# Patient Record
Sex: MALE | Race: WHITE | NOT HISPANIC OR LATINO | Employment: OTHER | URBAN - NONMETROPOLITAN AREA
[De-identification: names, ages, dates, MRNs, and addresses within clinical notes are randomized per-mention and may not be internally consistent; named-entity substitution may affect disease eponyms.]

---

## 2019-07-09 ENCOUNTER — HOSPITAL ENCOUNTER (OUTPATIENT)
Dept: GENERAL RADIOLOGY | Facility: HOSPITAL | Age: 79
Discharge: HOME | End: 2019-07-09
Attending: INTERNAL MEDICINE

## 2019-07-09 LAB
ALBUMIN SERPL-MCNC: 4.4 G/DL (ref 3.5–5)
ALBUMIN/GLOB SERPL: 1.2 {RATIO} (ref 1.4–2.6)
ALP SERPL-CCNC: 96 U/L (ref 56–155)
ALT SERPL-CCNC: 38 U/L (ref 10–40)
ANION GAP SERPL CALC-SCNC: 21 MMOL/L (ref 8–19)
AST SERPL-CCNC: 32 U/L (ref 15–50)
BASOPHILS # BLD AUTO: 0.12 10*3/UL (ref 0–0.2)
BASOPHILS NFR BLD AUTO: 0.9 % (ref 0–3)
BILIRUB SERPL-MCNC: 0.5 MG/DL (ref 0.2–1.3)
BUN SERPL-MCNC: 11 MG/DL (ref 5–25)
BUN/CREAT SERPL: 14 {RATIO} (ref 6–20)
CALCIUM SERPL-MCNC: 9.7 MG/DL (ref 8.7–10.4)
CHLORIDE SERPL-SCNC: 101 MMOL/L (ref 99–111)
CHOLEST SERPL-MCNC: 134 MG/DL (ref 107–200)
CHOLEST/HDLC SERPL: 3.1 {RATIO} (ref 3–6)
CONV ABS IMM GRAN: 0.05 10*3/UL (ref 0–0.2)
CONV CO2: 19 MMOL/L (ref 22–32)
CONV IMMATURE GRAN: 0.4 % (ref 0–1.8)
CONV TOTAL PROTEIN: 8.1 G/DL (ref 6.3–8.2)
CREAT UR-MCNC: 0.78 MG/DL (ref 0.7–1.2)
DEPRECATED RDW RBC AUTO: 48 FL (ref 35.1–43.9)
EOSINOPHIL # BLD AUTO: 0.28 10*3/UL (ref 0–0.7)
EOSINOPHIL # BLD AUTO: 2.2 % (ref 0–7)
ERYTHROCYTE [DISTWIDTH] IN BLOOD BY AUTOMATED COUNT: 13.7 % (ref 11.6–14.4)
GFR SERPLBLD BASED ON 1.73 SQ M-ARVRAT: >60 ML/MIN/{1.73_M2}
GLOBULIN UR ELPH-MCNC: 3.7 G/DL (ref 2–3.5)
GLUCOSE SERPL-MCNC: 91 MG/DL (ref 70–99)
HBA1C MFR BLD: 16.4 G/DL (ref 14–18)
HCT VFR BLD AUTO: 48 % (ref 42–52)
HDLC SERPL-MCNC: 43 MG/DL (ref 40–60)
LDLC SERPL CALC-MCNC: 55 MG/DL (ref 70–100)
LYMPHOCYTES # BLD AUTO: 3.33 10*3/UL (ref 1–5)
MCH RBC QN AUTO: 32.9 PG (ref 27–31)
MCHC RBC AUTO-ENTMCNC: 34.2 G/DL (ref 33–37)
MCV RBC AUTO: 96.2 FL (ref 80–96)
MONOCYTES # BLD AUTO: 0.96 10*3/UL (ref 0.2–1.2)
MONOCYTES NFR BLD AUTO: 7.5 % (ref 3–10)
NEUTROPHILS # BLD AUTO: 8.03 10*3/UL (ref 2–8)
NEUTROPHILS NFR BLD AUTO: 62.9 % (ref 30–85)
NRBC CBCN: 0 % (ref 0–0.7)
OSMOLALITY SERPL CALC.SUM OF ELEC: 283 MOSM/KG (ref 273–304)
PLATELET # BLD AUTO: 242 10*3/UL (ref 130–400)
PMV BLD AUTO: 9.8 FL (ref 9.4–12.4)
POTASSIUM SERPL-SCNC: 3.7 MMOL/L (ref 3.5–5.3)
RBC # BLD AUTO: 4.99 10*6/UL (ref 4.7–6.1)
SODIUM SERPL-SCNC: 137 MMOL/L (ref 135–147)
TRIGL SERPL-MCNC: 459 MG/DL (ref 40–150)
TSH SERPL-ACNC: 1.72 M[IU]/L (ref 0.27–4.2)
VARIANT LYMPHS NFR BLD MANUAL: 26.1 % (ref 20–45)
WBC # BLD AUTO: 12.77 10*3/UL (ref 4.8–10.8)

## 2019-10-09 ENCOUNTER — HOSPITAL ENCOUNTER (OUTPATIENT)
Dept: GENERAL RADIOLOGY | Facility: HOSPITAL | Age: 79
Discharge: HOME | End: 2019-10-09
Attending: INTERNAL MEDICINE

## 2019-10-09 LAB
ALBUMIN SERPL-MCNC: 4.3 G/DL (ref 3.5–5)
ALBUMIN/GLOB SERPL: 1.1 {RATIO} (ref 1.4–2.6)
ALP SERPL-CCNC: 106 U/L (ref 56–155)
ALT SERPL-CCNC: 33 U/L (ref 10–40)
ANION GAP SERPL CALC-SCNC: 19 MMOL/L (ref 8–19)
AST SERPL-CCNC: 40 U/L (ref 15–50)
BASOPHILS # BLD AUTO: 0.14 10*3/UL (ref 0–0.2)
BASOPHILS NFR BLD AUTO: 1.1 % (ref 0–3)
BILIRUB SERPL-MCNC: 0.35 MG/DL (ref 0.2–1.3)
BUN SERPL-MCNC: 10 MG/DL (ref 5–25)
BUN/CREAT SERPL: 11 {RATIO} (ref 6–20)
CALCIUM SERPL-MCNC: 10.1 MG/DL (ref 8.7–10.4)
CHLORIDE SERPL-SCNC: 99 MMOL/L (ref 99–111)
CHOLEST SERPL-MCNC: 153 MG/DL (ref 107–200)
CHOLEST/HDLC SERPL: 3.6 {RATIO} (ref 3–6)
CONV ABS IMM GRAN: 0.05 10*3/UL (ref 0–0.2)
CONV CO2: 21 MMOL/L (ref 22–32)
CONV IMMATURE GRAN: 0.4 % (ref 0–1.8)
CONV TOTAL PROTEIN: 8.1 G/DL (ref 6.3–8.2)
CREAT UR-MCNC: 0.9 MG/DL (ref 0.7–1.2)
DEPRECATED RDW RBC AUTO: 49 FL (ref 35.1–43.9)
EOSINOPHIL # BLD AUTO: 0.34 10*3/UL (ref 0–0.7)
EOSINOPHIL # BLD AUTO: 2.7 % (ref 0–7)
ERYTHROCYTE [DISTWIDTH] IN BLOOD BY AUTOMATED COUNT: 13.3 % (ref 11.6–14.4)
GFR SERPLBLD BASED ON 1.73 SQ M-ARVRAT: >60 ML/MIN/{1.73_M2}
GLOBULIN UR ELPH-MCNC: 3.8 G/DL (ref 2–3.5)
GLUCOSE SERPL-MCNC: 120 MG/DL (ref 70–99)
HCT VFR BLD AUTO: 48.9 % (ref 42–52)
HDLC SERPL-MCNC: 42 MG/DL (ref 40–60)
HGB BLD-MCNC: 16.5 G/DL (ref 14–18)
LDLC SERPL CALC-MCNC: 42 MG/DL (ref 70–100)
LYMPHOCYTES # BLD AUTO: 3.29 10*3/UL (ref 1–5)
LYMPHOCYTES NFR BLD AUTO: 26.4 % (ref 20–45)
MCH RBC QN AUTO: 33.3 PG (ref 27–31)
MCHC RBC AUTO-ENTMCNC: 33.7 G/DL (ref 33–37)
MCV RBC AUTO: 98.6 FL (ref 80–96)
MONOCYTES # BLD AUTO: 1.01 10*3/UL (ref 0.2–1.2)
MONOCYTES NFR BLD AUTO: 8.1 % (ref 3–10)
NEUTROPHILS # BLD AUTO: 7.61 10*3/UL (ref 2–8)
NEUTROPHILS NFR BLD AUTO: 61.3 % (ref 30–85)
NRBC CBCN: 0 % (ref 0–0.7)
OSMOLALITY SERPL CALC.SUM OF ELEC: 280 MOSM/KG (ref 273–304)
PLATELET # BLD AUTO: 268 10*3/UL (ref 130–400)
PMV BLD AUTO: 10.1 FL (ref 9.4–12.4)
POTASSIUM SERPL-SCNC: 3.9 MMOL/L (ref 3.5–5.3)
RBC # BLD AUTO: 4.96 10*6/UL (ref 4.7–6.1)
SODIUM SERPL-SCNC: 135 MMOL/L (ref 135–147)
TRIGL SERPL-MCNC: 750 MG/DL (ref 40–150)
TSH SERPL-ACNC: 1.95 M[IU]/L (ref 0.27–4.2)
WBC # BLD AUTO: 12.44 10*3/UL (ref 4.8–10.8)

## 2020-12-15 ENCOUNTER — HOSPITAL ENCOUNTER (OUTPATIENT)
Dept: GENERAL RADIOLOGY | Facility: HOSPITAL | Age: 80
Discharge: HOME | End: 2020-12-15
Attending: INTERNAL MEDICINE

## 2020-12-15 LAB
BASOPHILS # BLD AUTO: 0.12 10*3/UL (ref 0–0.2)
BASOPHILS NFR BLD AUTO: 1.2 % (ref 0–3)
CONV ABS IMM GRAN: 0.04 10*3/UL (ref 0–0.2)
CONV IMMATURE GRAN: 0.4 % (ref 0–1.8)
DEPRECATED RDW RBC AUTO: 46.7 FL (ref 35.1–43.9)
EOSINOPHIL # BLD AUTO: 0.32 10*3/UL (ref 0–0.7)
EOSINOPHIL # BLD AUTO: 3.3 % (ref 0–7)
ERYTHROCYTE [DISTWIDTH] IN BLOOD BY AUTOMATED COUNT: 13.2 % (ref 11.6–14.4)
HCT VFR BLD AUTO: 50.1 % (ref 42–52)
HGB BLD-MCNC: 17.3 G/DL (ref 14–18)
LYMPHOCYTES # BLD AUTO: 4.14 10*3/UL (ref 1–5)
LYMPHOCYTES NFR BLD AUTO: 43 % (ref 20–45)
MCH RBC QN AUTO: 33.3 PG (ref 27–31)
MCHC RBC AUTO-ENTMCNC: 34.5 G/DL (ref 33–37)
MCV RBC AUTO: 96.5 FL (ref 80–96)
MONOCYTES # BLD AUTO: 0.81 10*3/UL (ref 0.2–1.2)
MONOCYTES NFR BLD AUTO: 8.4 % (ref 3–10)
NEUTROPHILS # BLD AUTO: 4.19 10*3/UL (ref 2–8)
NEUTROPHILS NFR BLD AUTO: 43.7 % (ref 30–85)
NRBC CBCN: 0 % (ref 0–0.7)
PLATELET # BLD AUTO: 217 10*3/UL (ref 130–400)
PMV BLD AUTO: 10.2 FL (ref 9.4–12.4)
RBC # BLD AUTO: 5.19 10*6/UL (ref 4.7–6.1)
WBC # BLD AUTO: 9.62 10*3/UL (ref 4.8–10.8)

## 2021-02-09 ENCOUNTER — CONVERSION ENCOUNTER (OUTPATIENT)
Dept: ORTHOPEDIC SURGERY | Facility: CLINIC | Age: 81
End: 2021-02-09

## 2021-02-09 ENCOUNTER — OFFICE VISIT CONVERTED (OUTPATIENT)
Dept: ORTHOPEDIC SURGERY | Facility: CLINIC | Age: 81
End: 2021-02-09
Attending: PHYSICIAN ASSISTANT

## 2021-02-23 ENCOUNTER — OFFICE VISIT CONVERTED (OUTPATIENT)
Dept: ORTHOPEDIC SURGERY | Facility: CLINIC | Age: 81
End: 2021-02-23
Attending: PHYSICIAN ASSISTANT

## 2021-04-06 ENCOUNTER — CONVERSION ENCOUNTER (OUTPATIENT)
Dept: ORTHOPEDIC SURGERY | Facility: CLINIC | Age: 81
End: 2021-04-06

## 2021-04-06 ENCOUNTER — OFFICE VISIT CONVERTED (OUTPATIENT)
Dept: ORTHOPEDIC SURGERY | Facility: CLINIC | Age: 81
End: 2021-04-06

## 2021-05-10 NOTE — H&P
History and Physical      Patient Name: Rian Mckay   Patient ID: [id number]   Sex: Male   YOB: 1940    Referring Provider: Jose Varela MD    Visit Date: February 9, 2021    Provider: Juana Gutierrez PA-C   Location: [facility]   Location Address: [address] [place] [address]  [id number]   Location Phone: [phone]          Chief Complaint  · Left hip surgery      History Of Present Illness  Rian Mckay is a 80 year old male who presents today to [facility].      Patient is status post left hip hemiarthroplasty performed 01/25/21 by Dr. Varela. Patient states mild pain along the anterior thigh. Patient states Home Health is providing physical therapy. Patient is using a walker.       Past Medical History  Heart Attack; Hypertension; Shortness of Breath         Past Surgical History  Artificial Joints/Limbs; heart surgery         Allergy List  NO KNOWN DRUG ALLERGIES         Family Medical History  Heart Disease; Osteoporosis         Social History  Alcohol Use (Current some day); lives alone; Recreational Drug Use (Never); Retired.; Tobacco (Current every day);          Review of Systems  · Constitutional  o Denies  o : fever, chills, weight loss  · Cardiovascular  o Denies  o : chest pain, shortness of breath  · Gastrointestinal  o Denies  o : liver disease, heartburn, nausea, blood in stools  · Genitourinary  o Denies  o : painful urination, blood in urine  · Integument  o Denies  o : rash, itching  · Neurologic  o Denies  o : headache, weakness, loss of consciousness  · Musculoskeletal  o Denies  o : painful, swollen joints  · Psychiatric  o Denies  o : drug/alcohol addiction, anxiety, depression      Vitals  Date Time BP Position Site L\R Cuff Size HR RR TEMP (F) WT  HT  BMI kg/m2 BSA m2 O2 Sat FR L/min FiO2 HC       02/09/2021 10:04 AM      77 - R   205lbs 16oz 6'   27.94 2.18 97 %            Physical Examination  · Constitutional  o Appearance  o :  well developed, well-nourished, no obvious deformities present  · Head and Face  o Head  o :   § Inspection  § : normocephalic  o Face  o :   § Inspection  § : no facial lesions  · Eyes  o Conjunctivae  o : conjunctivae normal  o Sclerae  o : sclerae white  · Ears, Nose, Mouth and Throat  o Ears  o :   § External Ears  § : appearance within normal limits  § Hearing  § : intact  o Nose  o :   § External Nose  § : appearance normal  · Neck  o Inspection/Palpation  o : normal appearance  o Range of Motion  o : full range of motion  · Respiratory  o Respiratory Effort  o : breathing unlabored  o Inspection of Chest  o : normal appearance  o Auscultation of Lungs  o : no audible wheezing or rales  · Cardiovascular  o Heart  o : regular rate  · Gastrointestinal  o Abdominal Examination  o : soft and non-tender  · Skin and Subcutaneous Tissue  o General Inspection  o : intact, no rashes  · Psychiatric  o General  o : Alert and oriented x3  o Judgement and Insight  o : judgment and insight intact  o Mood and Affect  o : mood normal, affect appropriate  · In Office Procedures  o View  o : AP/LATERAL  o Site  o : left, hip   o Indication  o : Left hip pain   o Study  o : X-rays ordered, taken in the office, and reviewed today.  o Xray  o : stable implant  o Comparative Data  o : Comparative Data found and reviewed today   · Left Hip-Street Exam  o Inspection  o : incision well healed, mild erythema   o Palpation  o : tenderness at hip and pelvic muscles  o ROM  o : normal extension (30), normal abduction (45-50), normal adduction, normal internal rotation, normal external rotation  o Neurologic Testing  o : Neurovascular and sensation intact          Assessment  · Aftercare;following joint replacement     V54.81/Z47.1  · Left Pain: Hip     719.45/M25.559      Plan  · Orders  o Hip (Left) 2 or more views (includes AP Pelvis) [facility] Preferred View ([id number]) - 719.45/M25.559 - 02/09/2021  · Medications  o Medications have been  Reconciled  o Transition of Care or Provider Policy  · Instructions  o Staples removed in clinic today.  o Reviewed the patient's Past Medical, Social, and Family history as well as the ROS at today's visit, no changes.  o Call or return if worsening symptoms.  o X-ray ordered, taken and reviewed at this visit.  o Follow Up in 2 weeks.  o Electronically Identified Patient Education Materials Provided Electronically     Prescribed Keflex 500mg one po TID x 10 days, Norco 7.5/325mg one po q 6 hours   Follow up 2 weeks, check scar             Electronically Signed by: AZEEM Lopez-C -Author on February 9, 2021 10:40:00 AM  Electronically Co-signed by: Jose Varela MD -Reviewer on February 9, 2021 06:00:14 PM

## 2021-05-14 VITALS — HEIGHT: 72 IN | OXYGEN SATURATION: 95 % | BODY MASS INDEX: 27.94 KG/M2 | HEART RATE: 100 BPM | WEIGHT: 206.31 LBS

## 2021-05-14 VITALS — WEIGHT: 206 LBS | OXYGEN SATURATION: 97 % | BODY MASS INDEX: 27.9 KG/M2 | HEIGHT: 72 IN | HEART RATE: 77 BPM

## 2021-05-14 VITALS — OXYGEN SATURATION: 96 % | WEIGHT: 207.37 LBS | BODY MASS INDEX: 28.09 KG/M2 | HEART RATE: 84 BPM | HEIGHT: 72 IN

## 2021-05-14 NOTE — PROGRESS NOTES
Progress Note      Patient Name: Rian Mckay   Patient ID: [id number]   Sex: Male   YOB: 1940        Visit Date: April 6, 2021    Provider: Sukhwinder Dill PA-C   Location[facility]   Location Address: [address] [place] [address]  [id number]   Location Phone: [phone]          Chief Complaint  · left hip pain      History Of Present Illness  Rian Mckay is a 80 year old male who presents today to [facility].      Patient follows up today for left hip hemiarthroplasty performed 1/25/2021 by  to treat a left displaced femoral neck fracture.  Patient reports that he is doing better since the time of surgery however complains of decreased activity tolerance.  Patient was discharged from physical therapy on 3/23/2021 after completing 18 sessions.       Past Medical History  Heart Attack; Hypertension; Shortness of Breath         Past Surgical History  Artificial Joints/Limbs; heart surgery         Medication List  Norco 7.5-325 mg oral tablet         Allergy List  NO KNOWN DRUG ALLERGIES         Family Medical History  Heart Disease; - No Family History of Colorectal Cancer; Osteoporosis         Social History  Alcohol Use (Current some day); lives alone; Recreational Drug Use (Never); Retired.; Tobacco (Current every day);          Review of Systems  · Constitutional  o Denies  o : fever, chills, weight loss  · Cardiovascular  o Denies  o : chest pain, shortness of breath  · Gastrointestinal  o Denies  o : liver disease, heartburn, nausea, blood in stools  · Genitourinary  o Denies  o : painful urination, blood in urine  · Integument  o Denies  o : rash, itching  · Neurologic  o Denies  o : headache, weakness, loss of consciousness  · Musculoskeletal  o Denies  o : painful, swollen joints  · Psychiatric  o Denies  o : drug/alcohol addiction, anxiety, depression      Vitals  Date Time BP Position Site L\R Cuff Size HR RR TEMP (F) WT  HT  BMI kg/m2  BSA m2 O2 Sat FR L/min FiO2        04/06/2021 02:17 PM      100 - R   206lbs 5oz 6'   27.98 2.18 95 %            Physical Examination  · Constitutional  o Appearance  o : well developed, well-nourished, no obvious deformities present  · Head and Face  o Head  o :   § Inspection  § : normocephalic  o Face  o :   § Inspection  § : no facial lesions  · Eyes  o Conjunctivae  o : conjunctivae normal  o Sclerae  o : sclerae white  · Ears, Nose, Mouth and Throat  o Ears  o :   § External Ears  § : appearance within normal limits  § Hearing  § : intact  o Nose  o :   § External Nose  § : appearance normal  · Neck  o Inspection/Palpation  o : normal appearance  o Range of Motion  o : full range of motion  · Respiratory  o Respiratory Effort  o : breathing unlabored  o Inspection of Chest  o : normal appearance  o Auscultation of Lungs  o : no audible wheezing or rales  · Cardiovascular  o Heart  o : regular rate  · Gastrointestinal  o Abdominal Examination  o : soft and non-tender  · Skin and Subcutaneous Tissue  o General Inspection  o : intact, no rashes  · Psychiatric  o General  o : Alert and oriented x3  o Judgement and Insight  o : judgment and insight intact  o Mood and Affect  o : mood normal, affect appropriate  · Left Hip  o Inspection  o : Appearance is normal anatomic and atraumatic. Range of motion is consistent with the contralateral side. Patient is appreciated to have a two-stage sit to stand and uses a cane to assist with ambulation.  · In Office Procedures  o View  o : AP/LATERAL  o Site  o : left, hip   o Indication  o : left hip pain  o Study  o : X-rays ordered, taken in the office, and reviewed today.  o Xray  o : Postsurgical changes of left hemiarthroplasty with no appreciable loosening or displacement of retained hardware. Although alignments are appreciated to be anatomic.          Assessment  · Left Pain: Hip     719.45/M25.559  · Surgical aftercare, musculoskeletal  system     V58.78/Z47.89  · Closed fracture of neck of left femur with routine healing, subsequent encounter     V54.13/S72.002D      Plan  · Orders  o Hip (Left) 2 or more views (includes AP Pelvis) [facility] Preferred View ([id number]) - 719.45/M25.559 - 04/06/2021  · Medications  o Medications have been Reconciled  o Transition of Care or Provider Policy  · Instructions  o Reviewed the patient's Past Medical, Social, and Family history as well as the ROS at today's visit, no changes.  o Call or return if worsening symptoms.  o Patient may progressively return to all of his regular activities without restriction as discussed in clinic today. Patient is informed that activity tolerance should improve over the next few months. Patient is to follow-up in 3 months to evaluate his course of healing.  o Portions of this note were generated with voice recognition software. While efforts have been made to proofread the text, some sound alike errors may still persist.             Electronically Signed by: Sukhwinder Dill PA-C -Author on April 11, 2021 03:49:17 PM

## 2021-05-14 NOTE — PROGRESS NOTES
Progress Note      Patient Name: Rian Mckay   Patient ID: [id number]   Sex: Male   YOB: 1940        Visit Date: February 23, 2021    Provider: Juana Gutierrez PA-C   Location: [facility]   Location Address: [address] [place] [address]  [id number]   Location Phone: [phone]          Chief Complaint  · LEFT HIP PAIN      History Of Present Illness  Rian Mckay is a 80 year old male who presents today to [facility].      Patient is status post left hip hemiarthroplasty performed 01/25/21 by Dr. Varela. Patient states mild pain along the anterior thigh. Patient states Home Health is providing physical therapy. Patient is using a cane.            Past Medical History  Heart Attack; Hypertension; Shortness of Breath         Past Surgical History  Artificial Joints/Limbs; heart surgery         Medication List  Norco 7.5-325 mg oral tablet         Allergy List  NO KNOWN DRUG ALLERGIES         Family Medical History  Heart Disease; - No Family History of Colorectal Cancer; Osteoporosis         Social History  Alcohol Use (Current some day); lives alone; Recreational Drug Use (Never); Retired.; Tobacco (Current every day);          Review of Systems  · Constitutional  o Denies  o : fever, chills, weight loss  · Cardiovascular  o Denies  o : chest pain, shortness of breath  · Gastrointestinal  o Denies  o : liver disease, heartburn, nausea, blood in stools  · Genitourinary  o Denies  o : painful urination, blood in urine  · Integument  o Denies  o : rash, itching  · Neurologic  o Denies  o : headache, weakness, loss of consciousness  · Musculoskeletal  o Denies  o : painful, swollen joints  · Psychiatric  o Denies  o : drug/alcohol addiction, anxiety, depression      Vitals  Date Time BP Position Site L\R Cuff Size HR RR TEMP (F) WT  HT  BMI kg/m2 BSA m2 O2 Sat FR L/min FiO2 HC       02/23/2021 02:31 PM      84 - R   207lbs 6oz 6'   28.12 2.19 96 %  21%           Physical Examination  · Constitutional  o Appearance  o : well developed, well-nourished, no obvious deformities present  · Head and Face  o Head  o :   § Inspection  § : normocephalic  o Face  o :   § Inspection  § : no facial lesions  · Eyes  o Conjunctivae  o : conjunctivae normal  o Sclerae  o : sclerae white  · Ears, Nose, Mouth and Throat  o Ears  o :   § External Ears  § : appearance within normal limits  § Hearing  § : intact  o Nose  o :   § External Nose  § : appearance normal  · Neck  o Inspection/Palpation  o : normal appearance  o Range of Motion  o : full range of motion  · Respiratory  o Respiratory Effort  o : breathing unlabored  o Inspection of Chest  o : normal appearance  o Auscultation of Lungs  o : no audible wheezing or rales  · Cardiovascular  o Heart  o : regular rate  · Gastrointestinal  o Abdominal Examination  o : soft and non-tender  · Skin and Subcutaneous Tissue  o General Inspection  o : intact, no rashes  · Psychiatric  o General  o : Alert and oriented x3  o Judgement and Insight  o : judgment and insight intact  o Mood and Affect  o : mood normal, affect appropriate  · Left Hip-Street Exam  o Inspection  o : well-healed scar   o Palpation  o : tenderness at hip and pelvic muscles  o ROM  o : normal extension (30), normal abduction (45-50), normal adduction, normal internal rotation, normal external rotation  o Special Tests  o : no pain with flexion, no pain with extension, no pain with rotation  o Neurologic Testing  o : Neurovascular and sensation intact          Assessment  · Aftercare;following joint replacement     V54.81/Z47.1  · Left Pain: Hip     719.45/M25.559      Plan  · Medications  o Medications have been Reconciled  o Transition of Care or Provider Policy  · Instructions  o Reviewed the patient's Past Medical, Social, and Family history as well as the ROS at today's visit, no changes.  o Call or return if worsening symptoms.  o Follow up in 6  weeks.  o Electronically Identified Patient Education Materials Provided Electronically     follow up 6 weeks, x ray             Electronically Signed by: EULALIO LopezC -Author on February 23, 2021 03:19:31 PM

## 2021-07-06 ENCOUNTER — OFFICE VISIT (OUTPATIENT)
Dept: ORTHOPEDIC SURGERY | Facility: CLINIC | Age: 81
End: 2021-07-06

## 2021-07-06 VITALS — OXYGEN SATURATION: 95 % | WEIGHT: 210 LBS | BODY MASS INDEX: 28.44 KG/M2 | HEIGHT: 72 IN | HEART RATE: 93 BPM

## 2021-07-06 DIAGNOSIS — Z47.1 AFTERCARE FOLLOWING LEFT HIP JOINT REPLACEMENT SURGERY: Primary | ICD-10-CM

## 2021-07-06 DIAGNOSIS — Z96.642 AFTERCARE FOLLOWING LEFT HIP JOINT REPLACEMENT SURGERY: Primary | ICD-10-CM

## 2021-07-06 PROCEDURE — 99213 OFFICE O/P EST LOW 20 MIN: CPT | Performed by: PHYSICIAN ASSISTANT

## 2021-07-06 RX ORDER — ATORVASTATIN CALCIUM 20 MG/1
20 TABLET, FILM COATED ORAL DAILY
COMMUNITY
Start: 2021-05-17 | End: 2022-07-08 | Stop reason: SDUPTHER

## 2021-07-06 RX ORDER — METOPROLOL SUCCINATE 25 MG/1
25 TABLET, EXTENDED RELEASE ORAL DAILY
COMMUNITY
Start: 2021-05-17 | End: 2022-07-08 | Stop reason: SDUPTHER

## 2021-07-06 RX ORDER — AMLODIPINE BESYLATE 10 MG/1
10 TABLET ORAL DAILY
COMMUNITY
Start: 2021-05-17 | End: 2022-07-08 | Stop reason: SDUPTHER

## 2021-07-06 RX ORDER — LISINOPRIL 40 MG/1
40 TABLET ORAL DAILY
COMMUNITY
Start: 2021-05-17 | End: 2022-07-08 | Stop reason: SDUPTHER

## 2021-07-06 NOTE — PROGRESS NOTES
"Chief Complaint  Pain of the Left Hip    Subjective          Rian Mckay presents to [facility] for s/p left hip hemiarthroplasty on 01-25-21 by Dr. Varela for treat a left displaced femoral neck fracture. Patient reports to be doing well, he states he has \"no problem with his hip whatsoever\". He is pleased with his outcome.     Objective   Vital Signs:   Pulse 93   Ht 182.9 cm (72\")   Wt 95.3 kg (210 lb)   SpO2 95%   BMI 28.48 kg/m²       Physical Exam  Constitutional:       Appearance: Normal appearance. He is well-developed and normal weight.   HENT:      Head: Normocephalic.      Right Ear: Hearing and external ear normal.      Left Ear: Hearing and external ear normal.      Nose: Nose normal.   Eyes:      Conjunctiva/sclera: Conjunctivae normal.   Cardiovascular:      Rate and Rhythm: Normal rate.   Pulmonary:      Effort: Pulmonary effort is normal.      Breath sounds: No wheezing or rales.   Abdominal:      Palpations: Abdomen is soft.      Tenderness: There is no abdominal tenderness.   Musculoskeletal:      Cervical back: Normal range of motion.   Skin:     Findings: No rash.   Neurological:      Mental Status: He is alert and oriented to person, place, and time.   Psychiatric:         Mood and Affect: Mood and affect normal.         Judgment: Judgment normal.     Ortho Exam  Left hip: Full weightbearing.  Normal gait.  Well-healed scar.  No pain with sit to stand.  Forward flexion and abduction equal to contralateral side.  Good muscle tone of the hip extensors, flexors and abductors.  Normal plantar and dorsiflexion.  Flexion range of motion of the knee.  Sensation is intact.  Neurovascular intact.  Calf soft and nontender.    Result Review :            Imaging Results (Most Recent)     Procedure Component Value Units Date/Time    XR Hip With or Without Pelvis 2 - 3 View Left [[id number]] Resulted: 07/06/21 1452     Updated: 07/06/21 1454    Narrative:      X-Ray " Report:  Study: X-rays ordered, taken in the office, and reviewed today  Site: left hip Xray  Indication: left hip pain   View: AP and Lateral view(s)  Findings: Good alignment of left hip hemiarthroplasty. No signs of   hardware failure or loosening.   Prior studies available for comparison: yes                   Assessment and Plan    Problem List Items Addressed This Visit        Musculoskeletal and Injuries    Aftercare following left hip joint replacement surgery - Primary    Relevant Orders    XR Hip With or Without Pelvis 2 - 3 View Left (Completed)          Follow Up   Return if symptoms worsen or fail to improve.  Patient Instructions   Discussed treatment course with patient. Activity as tolerated and falls precautions, due to vision.   Patient able to follow-up PRN as long as he has no problems or concerns  Call with any changes or concerns.     Patient was given instructions and counseling regarding his condition or for health maintenance advice. Please see specific information pulled into the AVS if appropriate.

## 2021-07-06 NOTE — PATIENT INSTRUCTIONS
Discussed treatment course with patient. Activity as tolerated and falls precautions, due to vision.   Patient able to follow-up PRN as long as he has no problems or concerns  Call with any changes or concerns.

## 2021-12-07 ENCOUNTER — LAB (OUTPATIENT)
Dept: LAB | Facility: HOSPITAL | Age: 81
End: 2021-12-07

## 2021-12-07 ENCOUNTER — TRANSCRIBE ORDERS (OUTPATIENT)
Dept: LAB | Facility: HOSPITAL | Age: 81
End: 2021-12-07

## 2021-12-07 DIAGNOSIS — I10 ESSENTIAL HYPERTENSION, MALIGNANT: ICD-10-CM

## 2021-12-07 DIAGNOSIS — E78.5 HYPERLIPIDEMIA, UNSPECIFIED HYPERLIPIDEMIA TYPE: Primary | ICD-10-CM

## 2021-12-07 DIAGNOSIS — E78.5 HYPERLIPIDEMIA, UNSPECIFIED HYPERLIPIDEMIA TYPE: ICD-10-CM

## 2021-12-07 LAB
BASOPHILS # BLD AUTO: 0.12 10*3/MM3 (ref 0–0.2)
BASOPHILS NFR BLD AUTO: 1 % (ref 0–1.5)
DEPRECATED RDW RBC AUTO: 46 FL (ref 37–54)
EOSINOPHIL # BLD AUTO: 0.18 10*3/MM3 (ref 0–0.4)
EOSINOPHIL NFR BLD AUTO: 1.5 % (ref 0.3–6.2)
ERYTHROCYTE [DISTWIDTH] IN BLOOD BY AUTOMATED COUNT: 13.5 % (ref 12.3–15.4)
HCT VFR BLD AUTO: 48 % (ref 37.5–51)
HGB BLD-MCNC: 17.4 G/DL (ref 13–17.7)
IMM GRANULOCYTES # BLD AUTO: 0.04 10*3/MM3 (ref 0–0.05)
IMM GRANULOCYTES NFR BLD AUTO: 0.3 % (ref 0–0.5)
LYMPHOCYTES # BLD AUTO: 3.45 10*3/MM3 (ref 0.7–3.1)
LYMPHOCYTES NFR BLD AUTO: 29.4 % (ref 19.6–45.3)
MCH RBC QN AUTO: 34.3 PG (ref 26.6–33)
MCHC RBC AUTO-ENTMCNC: 36.3 G/DL (ref 31.5–35.7)
MCV RBC AUTO: 94.7 FL (ref 79–97)
MONOCYTES # BLD AUTO: 0.82 10*3/MM3 (ref 0.1–0.9)
MONOCYTES NFR BLD AUTO: 7 % (ref 5–12)
NEUTROPHILS NFR BLD AUTO: 60.8 % (ref 42.7–76)
NEUTROPHILS NFR BLD AUTO: 7.12 10*3/MM3 (ref 1.7–7)
NRBC BLD AUTO-RTO: 0.1 /100 WBC (ref 0–0.2)
PLATELET # BLD AUTO: 217 10*3/MM3 (ref 140–450)
PMV BLD AUTO: 10.6 FL (ref 6–12)
RBC # BLD AUTO: 5.07 10*6/MM3 (ref 4.14–5.8)
WBC NRBC COR # BLD: 11.73 10*3/MM3 (ref 3.4–10.8)

## 2021-12-07 PROCEDURE — 85025 COMPLETE CBC W/AUTO DIFF WBC: CPT

## 2021-12-07 PROCEDURE — 80053 COMPREHEN METABOLIC PANEL: CPT

## 2021-12-07 PROCEDURE — 84443 ASSAY THYROID STIM HORMONE: CPT

## 2021-12-07 PROCEDURE — 36415 COLL VENOUS BLD VENIPUNCTURE: CPT

## 2021-12-07 PROCEDURE — 80061 LIPID PANEL: CPT

## 2021-12-08 LAB
ALBUMIN SERPL-MCNC: 4.5 G/DL (ref 3.5–5.2)
ALBUMIN/GLOB SERPL: 1.2 G/DL
ALP SERPL-CCNC: 114 U/L (ref 39–117)
ALT SERPL W P-5'-P-CCNC: 46 U/L (ref 1–41)
ANION GAP SERPL CALCULATED.3IONS-SCNC: 12.8 MMOL/L (ref 5–15)
AST SERPL-CCNC: 48 U/L (ref 1–40)
BILIRUB SERPL-MCNC: 0.6 MG/DL (ref 0–1.2)
BUN SERPL-MCNC: 7 MG/DL (ref 8–23)
BUN/CREAT SERPL: 9.7 (ref 7–25)
CALCIUM SPEC-SCNC: 9.9 MG/DL (ref 8.6–10.5)
CHLORIDE SERPL-SCNC: 101 MMOL/L (ref 98–107)
CHOLEST SERPL-MCNC: 188 MG/DL (ref 0–200)
CO2 SERPL-SCNC: 22.2 MMOL/L (ref 22–29)
CREAT SERPL-MCNC: 0.72 MG/DL (ref 0.76–1.27)
GFR SERPL CREATININE-BSD FRML MDRD: 105 ML/MIN/1.73
GLOBULIN UR ELPH-MCNC: 3.8 GM/DL
GLUCOSE SERPL-MCNC: 119 MG/DL (ref 65–99)
HDLC SERPL-MCNC: 43 MG/DL (ref 40–60)
LDLC SERPL CALC-MCNC: 51 MG/DL (ref 0–100)
LDLC/HDLC SERPL: 0.37 {RATIO}
POTASSIUM SERPL-SCNC: 3.7 MMOL/L (ref 3.5–5.2)
PROT SERPL-MCNC: 8.3 G/DL (ref 6–8.5)
SODIUM SERPL-SCNC: 136 MMOL/L (ref 136–145)
TRIGL SERPL-MCNC: 646 MG/DL (ref 0–150)
TSH SERPL DL<=0.05 MIU/L-ACNC: 2.09 UIU/ML (ref 0.27–4.2)
VLDLC SERPL-MCNC: 94 MG/DL (ref 5–40)

## 2022-04-05 ENCOUNTER — LAB (OUTPATIENT)
Dept: LAB | Facility: HOSPITAL | Age: 82
End: 2022-04-05

## 2022-04-05 ENCOUNTER — OFFICE VISIT (OUTPATIENT)
Dept: FAMILY MEDICINE CLINIC | Facility: CLINIC | Age: 82
End: 2022-04-05

## 2022-04-05 VITALS
BODY MASS INDEX: 27.63 KG/M2 | HEIGHT: 72 IN | TEMPERATURE: 98.9 F | HEART RATE: 105 BPM | WEIGHT: 204 LBS | DIASTOLIC BLOOD PRESSURE: 104 MMHG | OXYGEN SATURATION: 95 % | RESPIRATION RATE: 18 BRPM | SYSTOLIC BLOOD PRESSURE: 157 MMHG

## 2022-04-05 DIAGNOSIS — E78.2 MIXED HYPERLIPIDEMIA: Chronic | ICD-10-CM

## 2022-04-05 DIAGNOSIS — G62.9 PERIPHERAL POLYNEUROPATHY: ICD-10-CM

## 2022-04-05 DIAGNOSIS — R73.03 PREDIABETES: ICD-10-CM

## 2022-04-05 DIAGNOSIS — L98.9 FACIAL LESION: Primary | ICD-10-CM

## 2022-04-05 DIAGNOSIS — I10 PRIMARY HYPERTENSION: Chronic | ICD-10-CM

## 2022-04-05 PROBLEM — I21.9 HEART ATTACK (HCC): Status: ACTIVE | Noted: 2022-04-05

## 2022-04-05 PROBLEM — I21.9 HEART ATTACK: Chronic | Status: ACTIVE | Noted: 2022-04-05

## 2022-04-05 PROBLEM — R06.02 SHORTNESS OF BREATH: Status: ACTIVE | Noted: 2022-04-05

## 2022-04-05 LAB
ALBUMIN SERPL-MCNC: 4.9 G/DL (ref 3.5–5.2)
ALBUMIN/GLOB SERPL: 1.4 G/DL
ALP SERPL-CCNC: 110 U/L (ref 39–117)
ALT SERPL W P-5'-P-CCNC: 44 U/L (ref 1–41)
ANION GAP SERPL CALCULATED.3IONS-SCNC: 16.3 MMOL/L (ref 5–15)
AST SERPL-CCNC: 52 U/L (ref 1–40)
BASOPHILS # BLD AUTO: 0.12 10*3/MM3 (ref 0–0.2)
BASOPHILS NFR BLD AUTO: 1 % (ref 0–1.5)
BILIRUB SERPL-MCNC: 0.7 MG/DL (ref 0–1.2)
BUN SERPL-MCNC: 10 MG/DL (ref 8–23)
BUN/CREAT SERPL: 13.2 (ref 7–25)
CALCIUM SPEC-SCNC: 10.3 MG/DL (ref 8.6–10.5)
CHLORIDE SERPL-SCNC: 101 MMOL/L (ref 98–107)
CO2 SERPL-SCNC: 20.7 MMOL/L (ref 22–29)
CREAT SERPL-MCNC: 0.76 MG/DL (ref 0.76–1.27)
DEPRECATED RDW RBC AUTO: 49.3 FL (ref 37–54)
EGFRCR SERPLBLD CKD-EPI 2021: 89.7 ML/MIN/1.73
EOSINOPHIL # BLD AUTO: 0.09 10*3/MM3 (ref 0–0.4)
EOSINOPHIL NFR BLD AUTO: 0.8 % (ref 0.3–6.2)
ERYTHROCYTE [DISTWIDTH] IN BLOOD BY AUTOMATED COUNT: 13.3 % (ref 12.3–15.4)
FOLATE SERPL-MCNC: 8.23 NG/ML (ref 4.78–24.2)
GLOBULIN UR ELPH-MCNC: 3.6 GM/DL
GLUCOSE SERPL-MCNC: 125 MG/DL (ref 65–99)
HCT VFR BLD AUTO: 50.6 % (ref 37.5–51)
HGB BLD-MCNC: 17.1 G/DL (ref 13–17.7)
IMM GRANULOCYTES # BLD AUTO: 0.04 10*3/MM3 (ref 0–0.05)
IMM GRANULOCYTES NFR BLD AUTO: 0.3 % (ref 0–0.5)
LYMPHOCYTES # BLD AUTO: 2.45 10*3/MM3 (ref 0.7–3.1)
LYMPHOCYTES NFR BLD AUTO: 20.4 % (ref 19.6–45.3)
MCH RBC QN AUTO: 33.9 PG (ref 26.6–33)
MCHC RBC AUTO-ENTMCNC: 33.8 G/DL (ref 31.5–35.7)
MCV RBC AUTO: 100.4 FL (ref 79–97)
MONOCYTES # BLD AUTO: 0.88 10*3/MM3 (ref 0.1–0.9)
MONOCYTES NFR BLD AUTO: 7.3 % (ref 5–12)
NEUTROPHILS NFR BLD AUTO: 70.2 % (ref 42.7–76)
NEUTROPHILS NFR BLD AUTO: 8.41 10*3/MM3 (ref 1.7–7)
NRBC BLD AUTO-RTO: 0 /100 WBC (ref 0–0.2)
PLATELET # BLD AUTO: 222 10*3/MM3 (ref 140–450)
PMV BLD AUTO: 10.7 FL (ref 6–12)
POTASSIUM SERPL-SCNC: 3.6 MMOL/L (ref 3.5–5.2)
PROT SERPL-MCNC: 8.5 G/DL (ref 6–8.5)
RBC # BLD AUTO: 5.04 10*6/MM3 (ref 4.14–5.8)
SODIUM SERPL-SCNC: 138 MMOL/L (ref 136–145)
VIT B12 BLD-MCNC: 566 PG/ML (ref 211–946)
WBC NRBC COR # BLD: 11.99 10*3/MM3 (ref 3.4–10.8)

## 2022-04-05 PROCEDURE — 83036 HEMOGLOBIN GLYCOSYLATED A1C: CPT

## 2022-04-05 PROCEDURE — 82607 VITAMIN B-12: CPT

## 2022-04-05 PROCEDURE — 99204 OFFICE O/P NEW MOD 45 MIN: CPT | Performed by: FAMILY MEDICINE

## 2022-04-05 PROCEDURE — 85025 COMPLETE CBC W/AUTO DIFF WBC: CPT

## 2022-04-05 PROCEDURE — 80053 COMPREHEN METABOLIC PANEL: CPT

## 2022-04-05 PROCEDURE — 82746 ASSAY OF FOLIC ACID SERUM: CPT

## 2022-04-05 PROCEDURE — 36415 COLL VENOUS BLD VENIPUNCTURE: CPT

## 2022-04-05 RX ORDER — ASPIRIN 81 MG/1
81 TABLET, CHEWABLE ORAL DAILY
COMMUNITY
End: 2022-07-08 | Stop reason: SDUPTHER

## 2022-04-05 NOTE — PROGRESS NOTES
"Chief Complaint  Establish Care and spot on face for couple months. Not healing    Subjective          Rian Mckay presents to [facility]  He is here today to establish relations as new patient.  He is a former patient of Dr. Joseph.  He has hypertension, hyperlipidemia and coronary artery disease.  He is a  and had 2 daughters.    He is complaining of a lesion on his right cheek that developed approximately 3 months ago.  It started out small and now is gotten larger.    The patient has no other complaints today and denies chest pain, shortness of breath, weakness, numbness, nausea, vomiting, diarrhea, dizziness or syncopal event.        Objective   Vital Signs:   BP (!) 157/104   Pulse 105   Temp 98.9 °F (37.2 °C)   Resp 18   Ht 182.9 cm (72\")   Wt 92.5 kg (204 lb)   SpO2 95%   BMI 27.67 kg/m²            Physical Exam  Vitals reviewed.   Constitutional:       Appearance: Normal appearance. He is well-developed.   HENT:      Head: Normocephalic and atraumatic.        Right Ear: External ear normal.      Left Ear: External ear normal.      Mouth/Throat:      Pharynx: No oropharyngeal exudate.   Eyes:      Conjunctiva/sclera: Conjunctivae normal.      Pupils: Pupils are equal, round, and reactive to light.   Neck:      Vascular: No carotid bruit.   Cardiovascular:      Rate and Rhythm: Normal rate and regular rhythm.      Heart sounds: No murmur heard.    No friction rub. No gallop.   Pulmonary:      Effort: Pulmonary effort is normal.      Breath sounds: Normal breath sounds. No wheezing or rhonchi.   Abdominal:      General: There is no distension.   Skin:     General: Skin is warm and dry.   Neurological:      Mental Status: He is alert and oriented to person, place, and time.      Cranial Nerves: No cranial nerve deficit.      Motor: No weakness.   Psychiatric:         Mood and Affect: Mood and affect normal.         Behavior: Behavior normal.         Thought " Content: Thought content normal.         Judgment: Judgment normal.        Result Review :     CMP    CMP 12/7/21   Glucose 119 (A)   BUN 7 (A)   Creatinine 0.72 (A)   eGFR Non African Am 105   Sodium 136   Potassium 3.7   Chloride 101   Calcium 9.9   Albumin 4.50   Total Bilirubin 0.6   Alkaline Phosphatase 114   AST (SGOT) 48 (A)   ALT (SGPT) 46 (A)   (A) Abnormal value       Comments are available for some flowsheets but are not being displayed.           CBC    CBC 12/7/21   WBC 11.73 (A)   RBC 5.07   Hemoglobin 17.4   Hematocrit 48.0   MCV 94.7   MCH 34.3 (A)   MCHC 36.3 (A)   RDW 13.5   Platelets 217   (A) Abnormal value            Lipid Panel    Lipid Panel 12/7/21   Total Cholesterol 188   Triglycerides 646 (A)   HDL Cholesterol 43   VLDL Cholesterol 94 (A)   LDL Cholesterol  51   LDL/HDL Ratio 0.37   (A) Abnormal value            TSH    TSH 12/7/21   TSH 2.090                     Assessment and Plan    Diagnoses and all orders for this visit:    1. Facial lesion (Primary)  Comments:  Looks concerning for basal cell carcinoma.  Referral placed to dermatology.  Orders:  -     Ambulatory Referral to Dermatology    2. Primary hypertension  Assessment & Plan:  Hypertension is improving with treatment.  Continue current treatment regimen.  Dietary sodium restriction.  Weight loss.  Blood pressure will be reassessed at the next regular appointment.      3. Mixed hyperlipidemia  Assessment & Plan:  Lipid abnormalities are improving with treatment.  Nutritional counseling was provided. and Pharmacotherapy as ordered.  Lipids will be reassessed in 3 months.      4. Peripheral polyneuropathy  -     CBC w AUTO Differential; Future  -     Comprehensive metabolic panel; Future  -     Hemoglobin A1c; Future  -     Vitamin B12; Future  -     Folate; Future    5. Prediabetes  -     Hemoglobin A1c; Future      Follow Up   Return in about 3 months (around 7/5/2022).  Patient was given instructions and counseling regarding his  condition or for health maintenance advice. Please see specific information pulled into the AVS if appropriate.

## 2022-04-06 LAB — HBA1C MFR BLD: 5.5 % (ref 4.8–5.6)

## 2022-04-07 ENCOUNTER — PATIENT ROUNDING (BHMG ONLY) (OUTPATIENT)
Dept: FAMILY MEDICINE CLINIC | Facility: CLINIC | Age: 82
End: 2022-04-07

## 2022-04-07 NOTE — PROGRESS NOTES
April 7, 2022    Hello, may I speak with Rian Mckay?    My name is Solange    I am  with [facility]  [address] [place] [id number] [phone].    Before we get started may I verify your date of birth? 1940    I am calling to officially welcome you to our practice and ask about your recent visit. Is this a good time to talk? yes    Tell me about your visit with us. What things went well?  everything went well       We're always looking for ways to make our patients' experiences even better. Do you have recommendations on ways we may improve?  no    Overall were you satisfied with your first visit to our practice? yes       I appreciate you taking the time to speak with me today. Is there anything else I can do for you? no      Thank you, and have a great day.

## 2022-07-08 ENCOUNTER — OFFICE VISIT (OUTPATIENT)
Dept: FAMILY MEDICINE CLINIC | Facility: CLINIC | Age: 82
End: 2022-07-08

## 2022-07-08 VITALS
TEMPERATURE: 97.6 F | RESPIRATION RATE: 18 BRPM | WEIGHT: 205 LBS | BODY MASS INDEX: 27.77 KG/M2 | HEIGHT: 72 IN | DIASTOLIC BLOOD PRESSURE: 80 MMHG | HEART RATE: 82 BPM | OXYGEN SATURATION: 97 % | SYSTOLIC BLOOD PRESSURE: 144 MMHG

## 2022-07-08 DIAGNOSIS — I10 PRIMARY HYPERTENSION: Primary | Chronic | ICD-10-CM

## 2022-07-08 DIAGNOSIS — E66.3 OVERWEIGHT WITH BODY MASS INDEX (BMI) OF 27 TO 27.9 IN ADULT: Chronic | ICD-10-CM

## 2022-07-08 DIAGNOSIS — E78.2 MIXED HYPERLIPIDEMIA: Chronic | ICD-10-CM

## 2022-07-08 PROBLEM — Z96.642 AFTERCARE FOLLOWING LEFT HIP JOINT REPLACEMENT SURGERY: Status: RESOLVED | Noted: 2021-07-06 | Resolved: 2022-07-08

## 2022-07-08 PROBLEM — Z47.1 AFTERCARE FOLLOWING LEFT HIP JOINT REPLACEMENT SURGERY: Status: RESOLVED | Noted: 2021-07-06 | Resolved: 2022-07-08

## 2022-07-08 PROCEDURE — 99214 OFFICE O/P EST MOD 30 MIN: CPT | Performed by: FAMILY MEDICINE

## 2022-07-08 RX ORDER — AMLODIPINE BESYLATE 10 MG/1
10 TABLET ORAL DAILY
Qty: 90 TABLET | Refills: 1 | Status: SHIPPED | OUTPATIENT
Start: 2022-07-08 | End: 2023-02-06

## 2022-07-08 RX ORDER — METOPROLOL SUCCINATE 25 MG/1
25 TABLET, EXTENDED RELEASE ORAL DAILY
Qty: 90 TABLET | Refills: 1 | Status: SHIPPED | OUTPATIENT
Start: 2022-07-08 | End: 2023-02-06

## 2022-07-08 RX ORDER — LISINOPRIL 40 MG/1
40 TABLET ORAL DAILY
Qty: 90 TABLET | Refills: 1 | Status: SHIPPED | OUTPATIENT
Start: 2022-07-08 | End: 2023-02-06

## 2022-07-08 RX ORDER — ASPIRIN 81 MG/1
81 TABLET, CHEWABLE ORAL DAILY
Qty: 90 TABLET | Refills: 1 | Status: SHIPPED | OUTPATIENT
Start: 2022-07-08 | End: 2022-10-06

## 2022-07-08 RX ORDER — ATORVASTATIN CALCIUM 20 MG/1
20 TABLET, FILM COATED ORAL DAILY
Qty: 90 TABLET | Refills: 1 | Status: SHIPPED | OUTPATIENT
Start: 2022-07-08 | End: 2023-02-06

## 2022-07-08 NOTE — ASSESSMENT & PLAN NOTE
Patient's (Body mass index is 27.8 kg/m².) indicates that they are overweight with health conditions that include hypertension and dyslipidemias . Weight is unchanged. BMI is is above average; BMI management plan is completed. We discussed low calorie, low carb based diet program, portion control and increasing exercise.

## 2022-07-08 NOTE — PROGRESS NOTES
"Chief Complaint  Hypertension (Needs med refill)    Subjective        Rian Mckay presents to [facility]  He is here today for management of his chronic medical conditions. He has hypertension, hyperlipidemia and coronary artery disease.  He is a  and had 2 daughters.    Since his last visit he has had a basal cell CA removed.      The patient has no other complaints today and denies chest pain, shortness of breath, weakness, numbness, nausea, vomiting, diarrhea, dizziness or syncopal event.      Objective   Vital Signs:  /80   Pulse 82   Temp 97.6 °F (36.4 °C)   Resp 18   Ht 182.9 cm (72\")   Wt 93 kg (205 lb)   SpO2 97%   BMI 27.80 kg/m²   Estimated body mass index is 27.8 kg/m² as calculated from the following:    Height as of this encounter: 182.9 cm (72\").    Weight as of this encounter: 93 kg (205 lb).          Physical Exam  Vitals reviewed.   Constitutional:       Appearance: Normal appearance. He is well-developed.   HENT:      Head: Normocephalic and atraumatic.      Right Ear: External ear normal.      Left Ear: External ear normal.      Mouth/Throat:      Pharynx: No oropharyngeal exudate.   Eyes:      Conjunctiva/sclera: Conjunctivae normal.      Pupils: Pupils are equal, round, and reactive to light.   Neck:      Vascular: No carotid bruit.   Cardiovascular:      Rate and Rhythm: Normal rate and regular rhythm.      Heart sounds: No murmur heard.    No friction rub. No gallop.   Pulmonary:      Effort: Pulmonary effort is normal.      Breath sounds: Normal breath sounds. No wheezing or rhonchi.   Abdominal:      General: There is no distension.   Skin:     General: Skin is warm and dry.   Neurological:      Mental Status: He is alert and oriented to person, place, and time.      Cranial Nerves: No cranial nerve deficit.      Motor: No weakness.   Psychiatric:         Mood and Affect: Mood and affect normal.         Behavior: Behavior normal.    "      Thought Content: Thought content normal.         Judgment: Judgment normal.        Result Review :    CMP    CMP 12/7/21 4/5/22   Glucose 119 (A) 125 (A)   BUN 7 (A) 10   Creatinine 0.72 (A) 0.76   eGFR Non African Am 105    Sodium 136 138   Potassium 3.7 3.6   Chloride 101 101   Calcium 9.9 10.3   Albumin 4.50 4.90   Total Bilirubin 0.6 0.7   Alkaline Phosphatase 114 110   AST (SGOT) 48 (A) 52 (A)   ALT (SGPT) 46 (A) 44 (A)   (A) Abnormal value       Comments are available for some flowsheets but are not being displayed.           CBC    CBC 12/7/21 4/5/22   WBC 11.73 (A) 11.99 (A)   RBC 5.07 5.04   Hemoglobin 17.4 17.1   Hematocrit 48.0 50.6   MCV 94.7 100.4 (A)   MCH 34.3 (A) 33.9 (A)   MCHC 36.3 (A) 33.8   RDW 13.5 13.3   Platelets 217 222   (A) Abnormal value            Lipid Panel    Lipid Panel 12/7/21   Total Cholesterol 188   Triglycerides 646 (A)   HDL Cholesterol 43   VLDL Cholesterol 94 (A)   LDL Cholesterol  51   LDL/HDL Ratio 0.37   (A) Abnormal value            TSH    TSH 12/7/21   TSH 2.090                     Assessment and Plan   Diagnoses and all orders for this visit:    1. Primary hypertension (Primary)  Assessment & Plan:  Hypertension is improving with treatment.  Continue current treatment regimen.  Dietary sodium restriction.  Weight loss.  Blood pressure will be reassessed at the next regular appointment.      2. Mixed hyperlipidemia  Assessment & Plan:  Lipid abnormalities are improving with treatment.  Nutritional counseling was provided. and Pharmacotherapy as ordered.  Lipids will be reassessed in 6 months.      3. Overweight with body mass index (BMI) of 27 to 27.9 in adult  Assessment & Plan:  Patient's (Body mass index is 27.8 kg/m².) indicates that they are overweight with health conditions that include hypertension and dyslipidemias . Weight is unchanged. BMI is is above average; BMI management plan is completed. We discussed low calorie, low carb based diet program, portion  control and increasing exercise.       Other orders  -     amLODIPine (NORVASC) 10 MG tablet; Take 1 tablet by mouth Daily for 90 days.  Dispense: 90 tablet; Refill: 1  -     aspirin 81 MG chewable tablet; Chew 1 tablet Daily for 90 days.  Dispense: 90 tablet; Refill: 1  -     atorvastatin (LIPITOR) 20 MG tablet; Take 1 tablet by mouth Daily for 90 days.  Dispense: 90 tablet; Refill: 1  -     lisinopril (PRINIVIL,ZESTRIL) 40 MG tablet; Take 1 tablet by mouth Daily for 90 days.  Dispense: 90 tablet; Refill: 1  -     metoprolol succinate XL (TOPROL-XL) 25 MG 24 hr tablet; Take 1 tablet by mouth Daily for 90 days.  Dispense: 90 tablet; Refill: 1           Follow Up   Return in about 6 months (around 1/8/2023).  Patient was given instructions and counseling regarding his condition or for health maintenance advice. Please see specific information pulled into the AVS if appropriate.

## 2023-01-03 ENCOUNTER — OFFICE VISIT (OUTPATIENT)
Dept: FAMILY MEDICINE CLINIC | Facility: CLINIC | Age: 83
End: 2023-01-03
Payer: MEDICARE

## 2023-01-03 VITALS
RESPIRATION RATE: 18 BRPM | BODY MASS INDEX: 28.65 KG/M2 | OXYGEN SATURATION: 94 % | SYSTOLIC BLOOD PRESSURE: 138 MMHG | TEMPERATURE: 98 F | HEART RATE: 102 BPM | DIASTOLIC BLOOD PRESSURE: 85 MMHG | HEIGHT: 72 IN | WEIGHT: 211.5 LBS

## 2023-01-03 DIAGNOSIS — E66.3 OVERWEIGHT WITH BODY MASS INDEX (BMI) OF 28 TO 28.9 IN ADULT: Chronic | ICD-10-CM

## 2023-01-03 DIAGNOSIS — Z13.220 SCREENING FOR LIPID DISORDERS: ICD-10-CM

## 2023-01-03 DIAGNOSIS — Z12.5 SCREENING FOR PROSTATE CANCER: ICD-10-CM

## 2023-01-03 DIAGNOSIS — Z00.00 ANNUAL PHYSICAL EXAM: ICD-10-CM

## 2023-01-03 DIAGNOSIS — Z00.00 MEDICARE ANNUAL WELLNESS VISIT, SUBSEQUENT: Primary | ICD-10-CM

## 2023-01-03 DIAGNOSIS — I10 PRIMARY HYPERTENSION: Chronic | ICD-10-CM

## 2023-01-03 PROCEDURE — 1126F AMNT PAIN NOTED NONE PRSNT: CPT | Performed by: FAMILY MEDICINE

## 2023-01-03 PROCEDURE — G0439 PPPS, SUBSEQ VISIT: HCPCS | Performed by: FAMILY MEDICINE

## 2023-01-03 PROCEDURE — 1159F MED LIST DOCD IN RCRD: CPT | Performed by: FAMILY MEDICINE

## 2023-01-03 PROCEDURE — 1170F FXNL STATUS ASSESSED: CPT | Performed by: FAMILY MEDICINE

## 2023-01-03 NOTE — PROGRESS NOTES
The ABCs of the Annual Wellness Visit  Subsequent Medicare Wellness Visit    Subjective    Rian Mckay is a 82 y.o. male who presents for a Subsequent Medicare Wellness Visit.    The following portions of the patient's history were reviewed and   updated as appropriate: allergies, current medications, past family history, past medical history, past social history, past surgical history and problem list.    Compared to one year ago, the patient feels his physical   health is the same.    Compared to one year ago, the patient feels his mental   health is the same.    Recent Hospitalizations:  He was not admitted to the hospital during the last year.       Current Medical Providers:  Patient Care Team:  Abby Montaño DO as PCP - General (Family Medicine)    Outpatient Medications Prior to Visit   Medication Sig Dispense Refill   • Calcium Polycarbophil (FIBERCON PO) Take  by mouth.     • Multiple Vitamins-Minerals (ICAPS AREDS 2 PO) Take  by mouth.     • lisinopril (PRINIVIL,ZESTRIL) 40 MG tablet Take 1 tablet by mouth Daily for 90 days. 90 tablet 1   • metoprolol succinate XL (TOPROL-XL) 25 MG 24 hr tablet Take 1 tablet by mouth Daily for 90 days. 90 tablet 1     No facility-administered medications prior to visit.       No opioid medication identified on active medication list. I have reviewed chart for other potential  high risk medication/s and harmful drug interactions in the elderly.          Aspirin is not on active medication list.  Aspirin use is not indicated based on review of current medical condition/s. Risk of harm outweighs potential benefits.  .    Patient Active Problem List   Diagnosis   • Medicare annual wellness visit, subsequent   • Hypertension   • Heart attack (HCC)   • Shortness of breath   • Mixed hyperlipidemia   • Overweight with body mass index (BMI) of 28 to 28.9 in adult     Advance Care Planning  Advance Directive is not on file.  ACP discussion was held with the patient during this  visit. Patient has an advance directive (not in EMR), copy requested.     Objective    Vitals:    23 1349   BP: 138/85   BP Location: Left arm   Patient Position: Sitting   Pulse: 102   Resp: 18   Temp: 98 °F (36.7 °C)   SpO2: 94%   Weight: 95.9 kg (211 lb 8 oz)   Height: 182.9 cm (72.01\")   PainSc: 0-No pain     Estimated body mass index is 28.68 kg/m² as calculated from the following:    Height as of this encounter: 182.9 cm (72.01\").    Weight as of this encounter: 95.9 kg (211 lb 8 oz).    BMI is >= 25 and <30. (Overweight) The following options were offered after discussion;: nutrition counseling/recommendations      Does the patient have evidence of cognitive impairment? No          HEALTH RISK ASSESSMENT    Smoking Status:  Social History     Tobacco Use   Smoking Status Former   • Packs/day: 0.25   • Years: 50.00   • Pack years: 12.50   • Types: Cigarettes   • Start date: 1953   • Quit date: 2022   • Years since quittin.3   Smokeless Tobacco Never   Tobacco Comments    Quit this past sep      Alcohol Consumption:  Social History     Substance and Sexual Activity   Alcohol Use Yes   • Alcohol/week: 2.0 standard drinks   • Types: 2 Shots of liquor per week    Comment: 2 drinks per day     Fall Risk Screen:    DANIA Fall Risk Assessment was completed, and patient is at LOW risk for falls.Assessment completed on:1/3/2023    Depression Screening:  PHQ-2/PHQ-9 Depression Screening 1/3/2023   Little Interest or Pleasure in Doing Things 0-->not at all   Feeling Down, Depressed or Hopeless 0-->not at all   PHQ-9: Brief Depression Severity Measure Score 0       Health Habits and Functional and Cognitive Screening:  Functional & Cognitive Status 1/3/2023   Do you have difficulty preparing food and eating? Yes   Do you have difficulty bathing yourself, getting dressed or grooming yourself? No   Do you have difficulty using the toilet? No   Do you have difficulty moving around from place to place? No    Do you have trouble with steps or getting out of a bed or a chair? No   Current Diet Other   Dental Exam Not up to date   Eye Exam Unknown   Exercise (times per week) 0 times per week   Current Exercises Include No Regular Exercise   Do you need help using the phone?  No   Are you deaf or do you have serious difficulty hearing?  No   Do you need help with transportation? Yes   Do you need help shopping? No   Do you need help preparing meals?  No   Do you need help with housework?  No   Do you need help with laundry? No   Do you need help taking your medications? No   Do you need help managing money? No   Do you ever drive or ride in a car without wearing a seat belt? No       Age-appropriate Screening Schedule:  Refer to the list below for future screening recommendations based on patient's age, sex and/or medical conditions. Orders for these recommended tests are listed in the plan section. The patient has been provided with a written plan.    Health Maintenance   Topic Date Due   • LIPID PANEL  01/03/2023 (Originally 12/7/2022)   • TDAP/TD VACCINES (1 - Tdap) 04/05/2023 (Originally 10/8/1959)   • ZOSTER VACCINE (1 of 2) 01/03/2024 (Originally 10/[birth year]/1989)   • INFLUENZA VACCINE  Completed                CMS Preventative Services Quick Reference  Risk Factors Identified During Encounter  None Identified  The above risks/problems have been discussed with the patient.  Pertinent information has been shared with the patient in the After Visit Summary.  An After Visit Summary and PPPS were made available to the patient.    Follow Up:   Next Medicare Wellness visit to be scheduled in 1 year.       Additional E&M Note during same encounter follows:  Patient has multiple medical problems which are significant and separately identifiable that require additional work above and beyond the Medicare Wellness Visit.      Chief Complaint  Medicare Wellness-subsequent    Subjective        HPI  Rian SUNDAR Mckay is also being seen  today for overweight and HTN.          Objective   Vital Signs:  /85 (BP Location: Left arm, Patient Position: Sitting)   Pulse 102   Temp 98 °F (36.7 °C)   Resp 18   Ht 182.9 cm (72.01\")   Wt 95.9 kg (211 lb 8 oz)   SpO2 94%   BMI 28.68 kg/m²     Physical Exam       CMP    CMP 4/5/22   Glucose 125 (A)   BUN 10   Creatinine 0.76   eGFR 89.7   Sodium 138   Potassium 3.6   Chloride 101   Calcium 10.3   Total Protein 8.5   Albumin 4.90   Globulin 3.6   Total Bilirubin 0.7   Alkaline Phosphatase 110   AST (SGOT) 52 (A)   ALT (SGPT) 44 (A)   Albumin/Globulin Ratio 1.4   BUN/Creatinine Ratio 13.2   Anion Gap 16.3 (A)   (A) Abnormal value       Comments are available for some flowsheets but are not being displayed.           CBC    CBC 4/5/22   WBC 11.99 (A)   RBC 5.04   Hemoglobin 17.1   Hematocrit 50.6   .4 (A)   MCH 33.9 (A)   MCHC 33.8   RDW 13.3   Platelets 222   (A) Abnormal value                               Assessment and Plan   Diagnoses and all orders for this visit:    1. Medicare annual wellness visit, subsequent (Primary)    2. Overweight with body mass index (BMI) of 28 to 28.9 in adult  Assessment & Plan:  Patient's (Body mass index is 28.68 kg/m².) indicates that they are overweight with health conditions that include hypertension . Weight is improving with treatment. BMI is is above average; BMI management plan is completed. We discussed low calorie, low carb based diet program, portion control and increasing exercise.       3. Primary hypertension  Assessment & Plan:  Hypertension is improving with treatment.  Continue current treatment regimen.  Dietary sodium restriction.  Weight loss.  Blood pressure will be reassessed at the next regular appointment.      4. Annual physical exam  -     Comprehensive Metabolic Panel; Future  -     CBC & Differential; Future  -     TSH; Future    5. Screening for lipid disorders  -     Lipid Panel; Future    6. Screening for prostate cancer  -      PSA Screen; Future           Follow Up   Return in about 6 months (around 7/3/2023).  Patient was given instructions and counseling regarding his condition or for health maintenance advice. Please see specific information pulled into the AVS if appropriate.

## 2023-01-03 NOTE — ASSESSMENT & PLAN NOTE
Patient's (Body mass index is 28.68 kg/m².) indicates that they are overweight with health conditions that include hypertension . Weight is improving with treatment. BMI is is above average; BMI management plan is completed. We discussed low calorie, low carb based diet program, portion control and increasing exercise.

## 2023-01-03 NOTE — ASSESSMENT & PLAN NOTE
Hypertension is improving with treatment.  Continue current treatment regimen.  Dietary sodium restriction.  Weight loss.  Blood pressure will be reassessed at the next regular appointment.

## 2023-02-06 RX ORDER — ATORVASTATIN CALCIUM 20 MG/1
20 TABLET, FILM COATED ORAL DAILY
Qty: 90 TABLET | Refills: 1 | Status: SHIPPED | OUTPATIENT
Start: 2023-02-06 | End: 2023-05-07

## 2023-02-06 RX ORDER — LISINOPRIL 40 MG/1
40 TABLET ORAL DAILY
Qty: 90 TABLET | Refills: 1 | Status: SHIPPED | OUTPATIENT
Start: 2023-02-06 | End: 2023-05-07

## 2023-02-06 RX ORDER — METOPROLOL SUCCINATE 25 MG/1
25 TABLET, EXTENDED RELEASE ORAL DAILY
Qty: 90 TABLET | Refills: 1 | Status: SHIPPED | OUTPATIENT
Start: 2023-02-06 | End: 2023-05-07

## 2023-02-06 RX ORDER — AMLODIPINE BESYLATE 10 MG/1
TABLET ORAL
Qty: 90 TABLET | Refills: 1 | Status: SHIPPED | OUTPATIENT
Start: 2023-02-06

## 2023-07-03 PROBLEM — Z00.00 MEDICARE ANNUAL WELLNESS VISIT, SUBSEQUENT: Status: RESOLVED | Noted: 2021-07-06 | Resolved: 2023-07-03

## 2023-07-03 PROBLEM — I10 HYPERTENSION: Chronic | Status: RESOLVED | Noted: 2022-04-05 | Resolved: 2023-07-03

## 2023-07-03 PROBLEM — E66.3 OVERWEIGHT WITH BODY MASS INDEX (BMI) OF 28 TO 28.9 IN ADULT: Chronic | Status: RESOLVED | Noted: 2022-07-08 | Resolved: 2023-07-03

## 2023-07-03 PROBLEM — R06.02 SHORTNESS OF BREATH: Status: RESOLVED | Noted: 2022-04-05 | Resolved: 2023-07-03

## 2023-08-01 RX ORDER — LISINOPRIL 40 MG/1
TABLET ORAL
Qty: 90 TABLET | Refills: 1 | Status: SHIPPED | OUTPATIENT
Start: 2023-08-01

## 2023-08-10 RX ORDER — ATORVASTATIN CALCIUM 20 MG/1
TABLET, FILM COATED ORAL
Qty: 90 TABLET | Refills: 1 | Status: SHIPPED | OUTPATIENT
Start: 2023-08-10

## 2023-08-10 RX ORDER — AMLODIPINE BESYLATE 10 MG/1
TABLET ORAL
Qty: 90 TABLET | Refills: 1 | Status: SHIPPED | OUTPATIENT
Start: 2023-08-10

## 2023-12-06 RX ORDER — FENOFIBRATE 145 MG/1
145 TABLET, COATED ORAL DAILY
Qty: 45 TABLET | Refills: 1 | Status: SHIPPED | OUTPATIENT
Start: 2023-12-06

## 2023-12-26 RX ORDER — METOPROLOL SUCCINATE 50 MG/1
50 TABLET, EXTENDED RELEASE ORAL DAILY
Qty: 45 TABLET | Refills: 1 | Status: SHIPPED | OUTPATIENT
Start: 2023-12-26 | End: 2024-06-23

## 2024-01-03 ENCOUNTER — LAB (OUTPATIENT)
Dept: LAB | Facility: HOSPITAL | Age: 84
End: 2024-01-03
Payer: MEDICARE

## 2024-01-03 DIAGNOSIS — Z00.00 ANNUAL PHYSICAL EXAM: ICD-10-CM

## 2024-01-03 DIAGNOSIS — E78.2 MIXED HYPERLIPIDEMIA: Chronic | ICD-10-CM

## 2024-01-03 LAB
ALBUMIN SERPL-MCNC: 4.1 G/DL (ref 3.5–5.2)
ALBUMIN/GLOB SERPL: 1.2 G/DL
ALP SERPL-CCNC: 53 U/L (ref 39–117)
ALT SERPL W P-5'-P-CCNC: 16 U/L (ref 1–41)
ANION GAP SERPL CALCULATED.3IONS-SCNC: 15.8 MMOL/L (ref 5–15)
AST SERPL-CCNC: 22 U/L (ref 1–40)
BASOPHILS # BLD AUTO: 0.1 10*3/MM3 (ref 0–0.2)
BASOPHILS NFR BLD AUTO: 1 % (ref 0–1.5)
BILIRUB SERPL-MCNC: 0.3 MG/DL (ref 0–1.2)
BUN SERPL-MCNC: 15 MG/DL (ref 8–23)
BUN/CREAT SERPL: 14.3 (ref 7–25)
CALCIUM SPEC-SCNC: 10.1 MG/DL (ref 8.6–10.5)
CHLORIDE SERPL-SCNC: 104 MMOL/L (ref 98–107)
CHOLEST SERPL-MCNC: 178 MG/DL (ref 0–200)
CO2 SERPL-SCNC: 21.2 MMOL/L (ref 22–29)
CREAT SERPL-MCNC: 1.05 MG/DL (ref 0.76–1.27)
DEPRECATED RDW RBC AUTO: 43.7 FL (ref 37–54)
EGFRCR SERPLBLD CKD-EPI 2021: 70 ML/MIN/1.73
EOSINOPHIL # BLD AUTO: 0.42 10*3/MM3 (ref 0–0.4)
EOSINOPHIL NFR BLD AUTO: 4.1 % (ref 0.3–6.2)
ERYTHROCYTE [DISTWIDTH] IN BLOOD BY AUTOMATED COUNT: 12.9 % (ref 12.3–15.4)
GLOBULIN UR ELPH-MCNC: 3.4 GM/DL
GLUCOSE SERPL-MCNC: 131 MG/DL (ref 65–99)
HCT VFR BLD AUTO: 47.6 % (ref 37.5–51)
HDLC SERPL-MCNC: 37 MG/DL (ref 40–60)
HGB BLD-MCNC: 15.8 G/DL (ref 13–17.7)
IMM GRANULOCYTES # BLD AUTO: 0.04 10*3/MM3 (ref 0–0.05)
IMM GRANULOCYTES NFR BLD AUTO: 0.4 % (ref 0–0.5)
LDLC SERPL CALC-MCNC: 85 MG/DL (ref 0–100)
LDLC/HDLC SERPL: 1.96 {RATIO}
LYMPHOCYTES # BLD AUTO: 3.55 10*3/MM3 (ref 0.7–3.1)
LYMPHOCYTES NFR BLD AUTO: 34.2 % (ref 19.6–45.3)
MCH RBC QN AUTO: 31.3 PG (ref 26.6–33)
MCHC RBC AUTO-ENTMCNC: 33.2 G/DL (ref 31.5–35.7)
MCV RBC AUTO: 94.3 FL (ref 79–97)
MONOCYTES # BLD AUTO: 0.69 10*3/MM3 (ref 0.1–0.9)
MONOCYTES NFR BLD AUTO: 6.7 % (ref 5–12)
NEUTROPHILS NFR BLD AUTO: 5.57 10*3/MM3 (ref 1.7–7)
NEUTROPHILS NFR BLD AUTO: 53.6 % (ref 42.7–76)
NRBC BLD AUTO-RTO: 0 /100 WBC (ref 0–0.2)
PLATELET # BLD AUTO: 283 10*3/MM3 (ref 140–450)
PMV BLD AUTO: 10 FL (ref 6–12)
POTASSIUM SERPL-SCNC: 4 MMOL/L (ref 3.5–5.2)
PROT SERPL-MCNC: 7.5 G/DL (ref 6–8.5)
RBC # BLD AUTO: 5.05 10*6/MM3 (ref 4.14–5.8)
SODIUM SERPL-SCNC: 141 MMOL/L (ref 136–145)
TRIGL SERPL-MCNC: 342 MG/DL (ref 0–150)
TSH SERPL DL<=0.05 MIU/L-ACNC: 1.47 UIU/ML (ref 0.27–4.2)
VLDLC SERPL-MCNC: 56 MG/DL (ref 5–40)
WBC NRBC COR # BLD AUTO: 10.37 10*3/MM3 (ref 3.4–10.8)

## 2024-01-03 PROCEDURE — 85025 COMPLETE CBC W/AUTO DIFF WBC: CPT

## 2024-01-03 PROCEDURE — 84443 ASSAY THYROID STIM HORMONE: CPT

## 2024-01-03 PROCEDURE — 80053 COMPREHEN METABOLIC PANEL: CPT

## 2024-01-03 PROCEDURE — 36415 COLL VENOUS BLD VENIPUNCTURE: CPT

## 2024-01-03 PROCEDURE — 80061 LIPID PANEL: CPT

## 2024-01-08 ENCOUNTER — OFFICE VISIT (OUTPATIENT)
Dept: FAMILY MEDICINE CLINIC | Facility: CLINIC | Age: 84
End: 2024-01-08
Payer: MEDICARE

## 2024-01-08 VITALS
DIASTOLIC BLOOD PRESSURE: 74 MMHG | HEIGHT: 72 IN | BODY MASS INDEX: 30.26 KG/M2 | RESPIRATION RATE: 18 BRPM | WEIGHT: 223.4 LBS | OXYGEN SATURATION: 96 % | SYSTOLIC BLOOD PRESSURE: 141 MMHG | HEART RATE: 82 BPM | TEMPERATURE: 98 F

## 2024-01-08 DIAGNOSIS — Z00.00 MEDICARE ANNUAL WELLNESS VISIT, SUBSEQUENT: Primary | ICD-10-CM

## 2024-01-08 DIAGNOSIS — E66.09 CLASS 1 OBESITY DUE TO EXCESS CALORIES WITH SERIOUS COMORBIDITY AND BODY MASS INDEX (BMI) OF 30.0 TO 30.9 IN ADULT: ICD-10-CM

## 2024-01-08 DIAGNOSIS — E78.2 MIXED HYPERLIPIDEMIA: ICD-10-CM

## 2024-01-08 DIAGNOSIS — I10 PRIMARY HYPERTENSION: ICD-10-CM

## 2024-01-08 PROBLEM — E66.811 CLASS 1 OBESITY DUE TO EXCESS CALORIES WITH SERIOUS COMORBIDITY AND BODY MASS INDEX (BMI) OF 30.0 TO 30.9 IN ADULT: Status: ACTIVE | Noted: 2024-01-08

## 2024-01-08 PROBLEM — E66.811 CLASS 1 OBESITY DUE TO EXCESS CALORIES WITH SERIOUS COMORBIDITY AND BODY MASS INDEX (BMI) OF 30.0 TO 30.9 IN ADULT: Chronic | Status: ACTIVE | Noted: 2024-01-08

## 2024-01-08 PROBLEM — E66.3 OVERWEIGHT WITH BODY MASS INDEX (BMI) OF 29 TO 29.9 IN ADULT: Chronic | Status: RESOLVED | Noted: 2022-07-08 | Resolved: 2024-01-08

## 2024-01-08 PROCEDURE — 3077F SYST BP >= 140 MM HG: CPT | Performed by: FAMILY MEDICINE

## 2024-01-08 PROCEDURE — 1159F MED LIST DOCD IN RCRD: CPT | Performed by: FAMILY MEDICINE

## 2024-01-08 PROCEDURE — 1170F FXNL STATUS ASSESSED: CPT | Performed by: FAMILY MEDICINE

## 2024-01-08 PROCEDURE — 3078F DIAST BP <80 MM HG: CPT | Performed by: FAMILY MEDICINE

## 2024-01-08 PROCEDURE — 99214 OFFICE O/P EST MOD 30 MIN: CPT | Performed by: FAMILY MEDICINE

## 2024-01-08 PROCEDURE — G0439 PPPS, SUBSEQ VISIT: HCPCS | Performed by: FAMILY MEDICINE

## 2024-01-08 NOTE — PROGRESS NOTES
The ABCs of the Annual Wellness Visit  Subsequent Medicare Wellness Visit    Subjective    Rian Mckay is a 83 y.o. male who presents for a Subsequent Medicare Wellness Visit.    The following portions of the patient's history were reviewed and   updated as appropriate: allergies, current medications, past family history, past medical history, past social history, past surgical history, and problem list.    Compared to one year ago, the patient feels his physical   health is the same.    Compared to one year ago, the patient feels his mental   health is the same.    Recent Hospitalizations:  He was not admitted to the hospital during the last year.       Current Medical Providers:  Patient Care Team:  Abby Montaño DO as PCP - General (Family Medicine)    Outpatient Medications Prior to Visit   Medication Sig Dispense Refill    amLODIPine (NORVASC) 10 MG tablet TAKE ONE TABLET BY MOUTH ONCE DAILY 90 tablet 1    atorvastatin (LIPITOR) 20 MG tablet TAKE 1 TABLET BY MOUTH DAILY FOR 90 DAYS. 90 tablet 1    Calcium Polycarbophil (FIBERCON PO) Take  by mouth.      fenofibrate (TRICOR) 145 MG tablet TAKE 1 TABLET BY MOUTH DAILY. 45 tablet 1    lisinopril (PRINIVIL,ZESTRIL) 40 MG tablet TAKE ONE TABLET BY MOUTH ONCE DAILY FOR 90 DAYS. 90 tablet 1    metoprolol succinate XL (TOPROL-XL) 50 MG 24 hr tablet TAKE 1 TABLET BY MOUTH DAILY  DAYS. 45 tablet 1    Multiple Vitamins-Minerals (ICAPS AREDS 2 PO) Take  by mouth.       No facility-administered medications prior to visit.       No opioid medication identified on active medication list. I have reviewed chart for other potential  high risk medication/s and harmful drug interactions in the elderly.        Aspirin is not on active medication list.  Aspirin use is not indicated based on review of current medical condition/s. Risk of harm outweighs potential benefits.  .    Patient Active Problem List   Diagnosis    Primary hypertension    Heart attack    Mixed  "hyperlipidemia    Class 1 obesity due to excess calories with serious comorbidity and body mass index (BMI) of 30.0 to 30.9 in adult     Advance Care Planning   Advance Care Planning     Advance Directive is not on file.  ACP discussion was held with the patient during this visit. Patient has an advance directive (not in EMR), copy requested.     Objective    Vitals:    24 1356   BP: 141/74   BP Location: Left arm   Patient Position: Sitting   Cuff Size: Adult   Pulse: 82   Resp: 18   Temp: 98 °F (36.7 °C)   TempSrc: Tympanic   SpO2: 96%   Weight: 101 kg (223 lb 6.4 oz)   Height: 182.9 cm (72.01\")   PainSc: 0-No pain     Estimated body mass index is 30.29 kg/m² as calculated from the following:    Height as of this encounter: 182.9 cm (72.01\").    Weight as of this encounter: 101 kg (223 lb 6.4 oz).           Does the patient have evidence of cognitive impairment? No    Lab Results   Component Value Date    TRIG 342 (H) 2024    HDL 37 (L) 2024    LDL 85 2024    VLDL 56 (H) 2024        HEALTH RISK ASSESSMENT    Smoking Status:  Social History     Tobacco Use   Smoking Status Former    Packs/day: 0.25    Years: 50.00    Additional pack years: 0.00    Total pack years: 12.50    Types: Cigarettes    Start date: 1953    Quit date: 2022    Years since quittin.3   Smokeless Tobacco Never   Tobacco Comments    Quit this past sep      Alcohol Consumption:  Social History     Substance and Sexual Activity   Alcohol Use Yes    Alcohol/week: 2.0 standard drinks of alcohol    Types: 2 Shots of liquor per week    Comment: 2 drinks per day     Fall Risk Screen:    DANIA Fall Risk Assessment was completed, and patient is at LOW risk for falls.Assessment completed on:2024    Depression Screenin/8/2024     1:55 PM   PHQ-2/PHQ-9 Depression Screening   Little Interest or Pleasure in Doing Things 0-->not at all   Feeling Down, Depressed or Hopeless 0-->not at all   PHQ-9: Brief " Depression Severity Measure Score 0       Health Habits and Functional and Cognitive Screenin/8/2024     1:00 PM   Functional & Cognitive Status   Do you have difficulty preparing food and eating? Yes   Do you have difficulty bathing yourself, getting dressed or grooming yourself? No   Do you have difficulty using the toilet? No   Do you have difficulty moving around from place to place? No   Do you have trouble with steps or getting out of a bed or a chair? No   Current Diet Well Balanced Diet   Dental Exam Not up to date   Eye Exam Up to date   Exercise (times per week) 0 times per week   Current Exercises Include No Regular Exercise   Do you need help using the phone?  Yes   Are you deaf or do you have serious difficulty hearing?  No   Do you need help to go to places out of walking distance? Yes   Do you need help shopping? No   Do you need help preparing meals?  Yes   Do you need help with housework?  Yes   Do you need help with laundry? Yes   Do you need help taking your medications? Yes   Do you need help managing money? No   Do you ever drive or ride in a car without wearing a seat belt? No   Have you felt unusual stress, anger or loneliness in the last month? No   Who do you live with? Alone   If you need help, do you have trouble finding someone available to you? No   Have you been bothered in the last four weeks by sexual problems? No   Do you have difficulty concentrating, remembering or making decisions? No       Age-appropriate Screening Schedule:  Refer to the list below for future screening recommendations based on patient's age, sex and/or medical conditions. Orders for these recommended tests are listed in the plan section. The patient has been provided with a written plan.    Health Maintenance   Topic Date Due    TDAP/TD VACCINES (1 - Tdap) 2024 (Originally 10/8/1959)    ZOSTER VACCINE (1 of 2) 2024 (Originally 10/8/1989)    COVID-19 Vaccine (3 - - season) 2024  "(Originally 9/1/2023)    Pneumococcal Vaccine 65+ (1 of 2 - PCV) 01/08/2025 (Originally 10/8/1946)    LIPID PANEL  01/03/2025    ANNUAL WELLNESS VISIT  01/08/2025    BMI FOLLOWUP  01/08/2025    INFLUENZA VACCINE  Completed                  CMS Preventative Services Quick Reference  Risk Factors Identified During Encounter  None Identified  The above risks/problems have been discussed with the patient.  Pertinent information has been shared with the patient in the After Visit Summary.  An After Visit Summary and PPPS were made available to the patient.    Follow Up:   Next Medicare Wellness visit to be scheduled in 1 year.       Additional E&M Note during same encounter follows:  Patient has multiple medical problems which are significant and separately identifiable that require additional work above and beyond the Medicare Wellness Visit.      Chief Complaint  Medicare Wellness-subsequent, Hypertension, and Hyperlipidemia    Subjective        HPI  Rian Mckay is also being seen today for HTN and HLD.     Review of Systems   HENT:  Negative for trouble swallowing.    Eyes:  Negative for visual disturbance.   Respiratory:  Positive for shortness of breath. Negative for apnea.    Cardiovascular:  Negative for chest pain.   Gastrointestinal:  Negative for blood in stool.   Endocrine: Negative for polyphagia.   Genitourinary:  Negative for dysuria.   Skin:  Negative for color change.   Allergic/Immunologic: Negative for immunocompromised state.   Neurological:  Negative for seizures.   Hematological:  Negative for adenopathy.   Psychiatric/Behavioral:  Negative for behavioral problems.        Objective   Vital Signs:  /74 (BP Location: Left arm, Patient Position: Sitting, Cuff Size: Adult)   Pulse 82   Temp 98 °F (36.7 °C) (Tympanic)   Resp 18   Ht 182.9 cm (72.01\")   Wt 101 kg (223 lb 6.4 oz)   SpO2 96%   BMI 30.29 kg/m²     Physical Exam  Vitals reviewed.   Constitutional:       Appearance: He is " well-developed. He is obese.   HENT:      Head: Normocephalic and atraumatic.      Right Ear: External ear normal.      Left Ear: External ear normal.      Mouth/Throat:      Pharynx: No oropharyngeal exudate.   Eyes:      Conjunctiva/sclera: Conjunctivae normal.      Pupils: Pupils are equal, round, and reactive to light.   Neck:      Vascular: No carotid bruit.   Cardiovascular:      Rate and Rhythm: Normal rate and regular rhythm.      Heart sounds: No murmur heard.     No friction rub. No gallop.   Pulmonary:      Effort: Pulmonary effort is normal.      Breath sounds: Normal breath sounds. No wheezing or rhonchi.   Abdominal:      General: There is no distension.   Skin:     General: Skin is warm and dry.   Neurological:      Mental Status: He is alert and oriented to person, place, and time.      Cranial Nerves: No cranial nerve deficit.      Motor: No weakness.   Psychiatric:         Mood and Affect: Mood and affect normal.         Behavior: Behavior normal.         Thought Content: Thought content normal.         Judgment: Judgment normal.            CMP          6/29/2023    13:22 1/3/2024    13:37   CMP   Glucose 115  131    BUN 10  15    Creatinine 0.71  1.05    EGFR 91.0  70.0    Sodium 140  141    Potassium 3.9  4.0    Chloride 104  104    Calcium 10.2  10.1    Total Protein 7.9  7.5    Albumin 4.4  4.1    Globulin 3.5  3.4    Total Bilirubin 0.5  0.3    Alkaline Phosphatase 95  53    AST (SGOT) 43  22    ALT (SGPT) 41  16    Albumin/Globulin Ratio 1.3  1.2    BUN/Creatinine Ratio 14.1  14.3    Anion Gap 14.2  15.8      CBC          6/29/2023    13:22 1/3/2024    13:37   CBC   WBC 10.44  10.37    RBC 4.84  5.05    Hemoglobin 16.3  15.8    Hematocrit 46.9  47.6    MCV 96.9  94.3    MCH 33.7  31.3    MCHC 34.8  33.2    RDW 13.6  12.9    Platelets 211  283      Lipid Panel          6/29/2023    13:22 1/3/2024    13:37   Lipid Panel   Total Cholesterol 189  178    Triglycerides 804  342    HDL Cholesterol  37  37    VLDL Cholesterol  56    LDL Cholesterol  38  85    LDL/HDL Ratio  1.96      TSH          6/29/2023    13:22 1/3/2024    13:37   TSH   TSH 1.970  1.470                 Assessment and Plan   Diagnoses and all orders for this visit:    1. Medicare annual wellness visit, subsequent (Primary)    2. Mixed hyperlipidemia  Assessment & Plan:  The patient is currently on Tricor for his hypertriglyceridemia.  He had triglycerides levels as high as 800.  He says that he has been on the Tricor now for approximately 6 months.  He says when he first started it he noted some shortness of breath after about a month.  It has continued.  He denies any chest pain and says the shortness of breath is after exertion.  He has not seen cardiology in several years.  I educated him about the need to consider switching the medication if he feels like it is causing him some shortness of breath.  He said right now he does not want to change anything.  I educated him about there is some risk the medication.  He verbalized understanding.  I encouraged him to go to cardiology, but he refused.  Will keep him on this medication for now and do repeat lipid levels in a year.  Of note the patient's triglycerides are half what they were in the past.      3. Primary hypertension  Assessment & Plan:  Hypertension is improving with treatment.  Continue current treatment regimen.  Dietary sodium restriction.  Weight loss.  Blood pressure will be reassessed at the next regular appointment.      4. Class 1 obesity due to excess calories with serious comorbidity and body mass index (BMI) of 30.0 to 30.9 in adult  Assessment & Plan:  Patient's (Body mass index is 30.29 kg/m².) indicates that they are obese (BMI >30) with health conditions that include hypertension and dyslipidemias . Weight is worsening. BMI  is above average; BMI management plan is completed. We discussed low calorie, low carb based diet program, portion control, and increasing  exercise.                Follow Up   Return in about 6 months (around 7/8/2024).  Patient was given instructions and counseling regarding his condition or for health maintenance advice. Please see specific information pulled into the AVS if appropriate.

## 2024-01-09 NOTE — ASSESSMENT & PLAN NOTE
The patient is currently on Tricor for his hypertriglyceridemia.  He had triglycerides levels as high as 800.  He says that he has been on the Tricor now for approximately 6 months.  He says when he first started it he noted some shortness of breath after about a month.  It has continued.  He denies any chest pain and says the shortness of breath is after exertion.  He has not seen cardiology in several years.  I educated him about the need to consider switching the medication if he feels like it is causing him some shortness of breath.  He said right now he does not want to change anything.  I educated him about there is some risk the medication.  He verbalized understanding.  I encouraged him to go to cardiology, but he refused.  Will keep him on this medication for now and do repeat lipid levels in a year.  Of note the patient's triglycerides are half what they were in the past.

## 2024-01-09 NOTE — ASSESSMENT & PLAN NOTE
Patient's (Body mass index is 30.29 kg/m².) indicates that they are obese (BMI >30) with health conditions that include hypertension and dyslipidemias . Weight is worsening. BMI  is above average; BMI management plan is completed. We discussed low calorie, low carb based diet program, portion control, and increasing exercise.

## 2024-01-26 RX ORDER — AMLODIPINE BESYLATE 10 MG/1
TABLET ORAL
Qty: 90 TABLET | Refills: 1 | Status: SHIPPED | OUTPATIENT
Start: 2024-01-26

## 2024-01-26 RX ORDER — ATORVASTATIN CALCIUM 20 MG/1
TABLET, FILM COATED ORAL
Qty: 90 TABLET | Refills: 1 | Status: SHIPPED | OUTPATIENT
Start: 2024-01-26

## 2024-01-26 RX ORDER — METOPROLOL SUCCINATE 50 MG/1
50 TABLET, EXTENDED RELEASE ORAL DAILY
Qty: 45 TABLET | Refills: 0 | Status: SHIPPED | OUTPATIENT
Start: 2024-01-26 | End: 2024-07-24

## 2024-01-26 RX ORDER — FENOFIBRATE 145 MG/1
145 TABLET, COATED ORAL DAILY
Qty: 45 TABLET | Refills: 0 | Status: SHIPPED | OUTPATIENT
Start: 2024-01-26

## 2024-01-26 RX ORDER — LISINOPRIL 40 MG/1
TABLET ORAL
Qty: 90 TABLET | Refills: 1 | Status: SHIPPED | OUTPATIENT
Start: 2024-01-26

## 2024-05-06 RX ORDER — METOPROLOL SUCCINATE 50 MG/1
50 TABLET, EXTENDED RELEASE ORAL DAILY
Qty: 90 TABLET | Refills: 0 | Status: SHIPPED | OUTPATIENT
Start: 2024-05-06 | End: 2024-11-02

## 2024-05-06 RX ORDER — FENOFIBRATE 145 MG/1
145 TABLET, COATED ORAL DAILY
Qty: 90 TABLET | Refills: 0 | Status: SHIPPED | OUTPATIENT
Start: 2024-05-06

## 2024-07-08 ENCOUNTER — OFFICE VISIT (OUTPATIENT)
Dept: FAMILY MEDICINE CLINIC | Facility: CLINIC | Age: 84
End: 2024-07-08
Payer: MEDICARE

## 2024-07-08 VITALS
DIASTOLIC BLOOD PRESSURE: 85 MMHG | SYSTOLIC BLOOD PRESSURE: 146 MMHG | WEIGHT: 214.2 LBS | BODY MASS INDEX: 29.01 KG/M2 | TEMPERATURE: 97.5 F | RESPIRATION RATE: 20 BRPM | HEART RATE: 79 BPM | OXYGEN SATURATION: 94 % | HEIGHT: 72 IN

## 2024-07-08 DIAGNOSIS — Z12.5 SCREENING FOR PROSTATE CANCER: ICD-10-CM

## 2024-07-08 DIAGNOSIS — Z00.00 ANNUAL PHYSICAL EXAM: ICD-10-CM

## 2024-07-08 DIAGNOSIS — E66.3 OVERWEIGHT WITH BODY MASS INDEX (BMI) OF 29 TO 29.9 IN ADULT: Chronic | ICD-10-CM

## 2024-07-08 DIAGNOSIS — E78.2 MIXED HYPERLIPIDEMIA: Chronic | ICD-10-CM

## 2024-07-08 DIAGNOSIS — I10 PRIMARY HYPERTENSION: Primary | Chronic | ICD-10-CM

## 2024-07-08 PROBLEM — E66.09 CLASS 1 OBESITY DUE TO EXCESS CALORIES WITH SERIOUS COMORBIDITY AND BODY MASS INDEX (BMI) OF 30.0 TO 30.9 IN ADULT: Chronic | Status: RESOLVED | Noted: 2024-01-08 | Resolved: 2024-07-08

## 2024-07-08 PROBLEM — E66.811 CLASS 1 OBESITY DUE TO EXCESS CALORIES WITH SERIOUS COMORBIDITY AND BODY MASS INDEX (BMI) OF 30.0 TO 30.9 IN ADULT: Chronic | Status: RESOLVED | Noted: 2024-01-08 | Resolved: 2024-07-08

## 2024-07-08 PROCEDURE — 3077F SYST BP >= 140 MM HG: CPT | Performed by: FAMILY MEDICINE

## 2024-07-08 PROCEDURE — 3079F DIAST BP 80-89 MM HG: CPT | Performed by: FAMILY MEDICINE

## 2024-07-08 PROCEDURE — G2211 COMPLEX E/M VISIT ADD ON: HCPCS | Performed by: FAMILY MEDICINE

## 2024-07-08 PROCEDURE — 1160F RVW MEDS BY RX/DR IN RCRD: CPT | Performed by: FAMILY MEDICINE

## 2024-07-08 PROCEDURE — 99214 OFFICE O/P EST MOD 30 MIN: CPT | Performed by: FAMILY MEDICINE

## 2024-07-08 PROCEDURE — 1159F MED LIST DOCD IN RCRD: CPT | Performed by: FAMILY MEDICINE

## 2024-07-08 PROCEDURE — 1126F AMNT PAIN NOTED NONE PRSNT: CPT | Performed by: FAMILY MEDICINE

## 2024-07-08 NOTE — PROGRESS NOTES
"Chief Complaint  Follow-up (6 month follow up)    Subjective        Rian Mckay presents to [facility]  History of Present Illness  He is here today for management of his chronic medical conditions. He has hypertension, hyperlipidemia, basal cell skin cancer and coronary artery disease.  He is a  and had 2 daughters.     He is getting injections for dry macular degeneration.     He has lost 9 pounds since his last visit.     The patient has no other complaints today and denies chest pain, shortness of breath, weakness, numbness, nausea, vomiting, diarrhea, dizziness or syncopal event.        Objective   Vital Signs:  /85 (BP Location: Left arm, Patient Position: Sitting, Cuff Size: Adult)   Pulse 79   Temp 97.5 °F (36.4 °C) (Tympanic)   Resp 20   Ht 182.9 cm (72.01\")   Wt 97.2 kg (214 lb 3.2 oz)   SpO2 94%   BMI 29.04 kg/m²   Estimated body mass index is 29.04 kg/m² as calculated from the following:    Height as of this encounter: 182.9 cm (72.01\").    Weight as of this encounter: 97.2 kg (214 lb 3.2 oz).               Physical Exam  Vitals reviewed.   Constitutional:       Appearance: He is well-developed and overweight.   HENT:      Head: Normocephalic and atraumatic.      Right Ear: External ear normal.      Left Ear: External ear normal.      Mouth/Throat:      Pharynx: No oropharyngeal exudate.   Eyes:      Conjunctiva/sclera: Conjunctivae normal.      Pupils: Pupils are equal, round, and reactive to light.   Neck:      Vascular: No carotid bruit.   Cardiovascular:      Rate and Rhythm: Normal rate and regular rhythm.      Heart sounds: No murmur heard.     No friction rub. No gallop.   Pulmonary:      Effort: Pulmonary effort is normal.      Breath sounds: Normal breath sounds. No wheezing or rhonchi.   Abdominal:      General: There is no distension.   Skin:     General: Skin is warm and dry.   Neurological:      Mental Status: He is alert and oriented " to person, place, and time.      Cranial Nerves: No cranial nerve deficit.      Motor: No weakness.   Psychiatric:         Mood and Affect: Mood and affect normal.         Behavior: Behavior normal.         Thought Content: Thought content normal.         Judgment: Judgment normal.        Result Review :      CMP          1/3/2024    13:37   CMP   Glucose 131    BUN 15    Creatinine 1.05    EGFR 70.0    Sodium 141    Potassium 4.0    Chloride 104    Calcium 10.1    Total Protein 7.5    Albumin 4.1    Globulin 3.4    Total Bilirubin 0.3    Alkaline Phosphatase 53    AST (SGOT) 22    ALT (SGPT) 16    Albumin/Globulin Ratio 1.2    BUN/Creatinine Ratio 14.3    Anion Gap 15.8      CBC          1/3/2024    13:37   CBC   WBC 10.37    RBC 5.05    Hemoglobin 15.8    Hematocrit 47.6    MCV 94.3    MCH 31.3    MCHC 33.2    RDW 12.9    Platelets 283      Lipid Panel          1/3/2024    13:37   Lipid Panel   Total Cholesterol 178    Triglycerides 342    HDL Cholesterol 37    VLDL Cholesterol 56    LDL Cholesterol  85    LDL/HDL Ratio 1.96      TSH          1/3/2024    13:37   TSH   TSH 1.470                   Assessment and Plan     Diagnoses and all orders for this visit:    1. Primary hypertension (Primary)  Assessment & Plan:  Hypertension is stable and controlled  Continue current treatment regimen.  Dietary sodium restriction.  Weight loss.  Blood pressure will be reassessed in 6 months.      2. Mixed hyperlipidemia  Assessment & Plan:   Lipid abnormalities are improving with treatment    Plan:  Continue same medication/s without change.      Discussed medication dosage, use, side effects, and goals of treatment in detail.    Counseled patient on lifestyle modifications to help control hyperlipidemia.   Cholesterol lowering dietary information shared with patient.    Patient Treatment Goals:   LDL goal is under 100    Followup in 6 months.    Orders:  -     Lipid Panel; Future    3. Overweight with body mass index (BMI) of  29 to 29.9 in adult  Assessment & Plan:  Patient's (Body mass index is 29.04 kg/m².) indicates that they are overweight with health conditions that include hypertension and dyslipidemias . Weight is improving with lifestyle modifications. BMI is is above average; BMI management plan is completed. We discussed low calorie, low carb based diet program, portion control, and increasing exercise.       4. Annual physical exam  -     Urinalysis With Microscopic - Urine, Clean Catch; Future  -     Comprehensive Metabolic Panel; Future  -     CBC & Differential; Future  -     TSH; Future    5. Screening for prostate cancer  -     PSA Screen; Future             Follow Up     Return in about 6 months (around 1/8/2025).  Patient was given instructions and counseling regarding his condition or for health maintenance advice. Please see specific information pulled into the AVS if appropriate.

## 2024-07-08 NOTE — ASSESSMENT & PLAN NOTE
Patient's (Body mass index is 29.04 kg/m².) indicates that they are overweight with health conditions that include hypertension and dyslipidemias . Weight is improving with lifestyle modifications. BMI is is above average; BMI management plan is completed. We discussed low calorie, low carb based diet program, portion control, and increasing exercise.

## 2024-08-05 RX ORDER — LISINOPRIL 40 MG/1
TABLET ORAL
Qty: 90 TABLET | Refills: 1 | Status: SHIPPED | OUTPATIENT
Start: 2024-08-05

## 2024-08-05 RX ORDER — FENOFIBRATE 145 MG/1
145 TABLET, COATED ORAL DAILY
Qty: 90 TABLET | Refills: 1 | Status: SHIPPED | OUTPATIENT
Start: 2024-08-05

## 2024-08-05 RX ORDER — ATORVASTATIN CALCIUM 20 MG/1
TABLET, FILM COATED ORAL
Qty: 90 TABLET | Refills: 1 | Status: SHIPPED | OUTPATIENT
Start: 2024-08-05

## 2024-08-05 RX ORDER — AMLODIPINE BESYLATE 10 MG/1
TABLET ORAL
Qty: 90 TABLET | Refills: 1 | Status: SHIPPED | OUTPATIENT
Start: 2024-08-05

## 2024-08-05 RX ORDER — METOPROLOL SUCCINATE 50 MG/1
50 TABLET, EXTENDED RELEASE ORAL DAILY
Qty: 90 TABLET | Refills: 1 | Status: SHIPPED | OUTPATIENT
Start: 2024-08-05 | End: 2025-02-01

## 2024-12-23 ENCOUNTER — LAB (OUTPATIENT)
Dept: LAB | Facility: HOSPITAL | Age: 84
End: 2024-12-23
Payer: MEDICARE

## 2024-12-23 DIAGNOSIS — Z00.00 ANNUAL PHYSICAL EXAM: ICD-10-CM

## 2024-12-23 DIAGNOSIS — Z12.5 SCREENING FOR PROSTATE CANCER: ICD-10-CM

## 2024-12-23 DIAGNOSIS — E78.2 MIXED HYPERLIPIDEMIA: Chronic | ICD-10-CM

## 2024-12-23 LAB
ALBUMIN SERPL-MCNC: 4.3 G/DL (ref 3.5–5.2)
ALBUMIN/GLOB SERPL: 1.3 G/DL
ALP SERPL-CCNC: 50 U/L (ref 39–117)
ALT SERPL W P-5'-P-CCNC: 14 U/L (ref 1–41)
ANION GAP SERPL CALCULATED.3IONS-SCNC: 15.9 MMOL/L (ref 5–15)
AST SERPL-CCNC: 19 U/L (ref 1–40)
BASOPHILS # BLD AUTO: 0.1 10*3/MM3 (ref 0–0.2)
BASOPHILS NFR BLD AUTO: 0.8 % (ref 0–1.5)
BILIRUB SERPL-MCNC: 0.4 MG/DL (ref 0–1.2)
BUN SERPL-MCNC: 11 MG/DL (ref 8–23)
BUN/CREAT SERPL: 11.7 (ref 7–25)
CALCIUM SPEC-SCNC: 10 MG/DL (ref 8.6–10.5)
CHLORIDE SERPL-SCNC: 101 MMOL/L (ref 98–107)
CHOLEST SERPL-MCNC: 179 MG/DL (ref 0–200)
CO2 SERPL-SCNC: 19.1 MMOL/L (ref 22–29)
CREAT SERPL-MCNC: 0.94 MG/DL (ref 0.76–1.27)
DEPRECATED RDW RBC AUTO: 44 FL (ref 37–54)
EGFRCR SERPLBLD CKD-EPI 2021: 79.4 ML/MIN/1.73
EOSINOPHIL # BLD AUTO: 0.44 10*3/MM3 (ref 0–0.4)
EOSINOPHIL NFR BLD AUTO: 3.6 % (ref 0.3–6.2)
ERYTHROCYTE [DISTWIDTH] IN BLOOD BY AUTOMATED COUNT: 12.6 % (ref 12.3–15.4)
GLOBULIN UR ELPH-MCNC: 3.3 GM/DL
GLUCOSE SERPL-MCNC: 114 MG/DL (ref 65–99)
HCT VFR BLD AUTO: 47.1 % (ref 37.5–51)
HDLC SERPL-MCNC: 42 MG/DL (ref 40–60)
HGB BLD-MCNC: 16.3 G/DL (ref 13–17.7)
IMM GRANULOCYTES # BLD AUTO: 0.05 10*3/MM3 (ref 0–0.05)
IMM GRANULOCYTES NFR BLD AUTO: 0.4 % (ref 0–0.5)
LDLC SERPL CALC-MCNC: 90 MG/DL (ref 0–100)
LDLC/HDLC SERPL: 1.93 {RATIO}
LYMPHOCYTES # BLD AUTO: 3.5 10*3/MM3 (ref 0.7–3.1)
LYMPHOCYTES NFR BLD AUTO: 28.8 % (ref 19.6–45.3)
MCH RBC QN AUTO: 33.2 PG (ref 26.6–33)
MCHC RBC AUTO-ENTMCNC: 34.6 G/DL (ref 31.5–35.7)
MCV RBC AUTO: 95.9 FL (ref 79–97)
MONOCYTES # BLD AUTO: 1.05 10*3/MM3 (ref 0.1–0.9)
MONOCYTES NFR BLD AUTO: 8.6 % (ref 5–12)
NEUTROPHILS NFR BLD AUTO: 57.8 % (ref 42.7–76)
NEUTROPHILS NFR BLD AUTO: 7.03 10*3/MM3 (ref 1.7–7)
NRBC BLD AUTO-RTO: 0 /100 WBC (ref 0–0.2)
PLATELET # BLD AUTO: 344 10*3/MM3 (ref 140–450)
PMV BLD AUTO: 10.2 FL (ref 6–12)
POTASSIUM SERPL-SCNC: 3.9 MMOL/L (ref 3.5–5.2)
PROT SERPL-MCNC: 7.6 G/DL (ref 6–8.5)
PSA SERPL-MCNC: 0.67 NG/ML (ref 0–4)
RBC # BLD AUTO: 4.91 10*6/MM3 (ref 4.14–5.8)
SODIUM SERPL-SCNC: 136 MMOL/L (ref 136–145)
TRIGL SERPL-MCNC: 280 MG/DL (ref 0–150)
TSH SERPL DL<=0.05 MIU/L-ACNC: 1.82 UIU/ML (ref 0.27–4.2)
VLDLC SERPL-MCNC: 47 MG/DL (ref 5–40)
WBC NRBC COR # BLD AUTO: 12.17 10*3/MM3 (ref 3.4–10.8)

## 2024-12-23 PROCEDURE — 80061 LIPID PANEL: CPT

## 2024-12-23 PROCEDURE — 84443 ASSAY THYROID STIM HORMONE: CPT

## 2024-12-23 PROCEDURE — 80053 COMPREHEN METABOLIC PANEL: CPT

## 2024-12-23 PROCEDURE — 36415 COLL VENOUS BLD VENIPUNCTURE: CPT

## 2024-12-23 PROCEDURE — G0103 PSA SCREENING: HCPCS

## 2024-12-23 PROCEDURE — 85025 COMPLETE CBC W/AUTO DIFF WBC: CPT

## 2025-01-14 ENCOUNTER — OFFICE VISIT (OUTPATIENT)
Dept: FAMILY MEDICINE CLINIC | Facility: CLINIC | Age: 85
End: 2025-01-14
Payer: MEDICARE

## 2025-01-14 VITALS
DIASTOLIC BLOOD PRESSURE: 90 MMHG | HEART RATE: 77 BPM | BODY MASS INDEX: 29.04 KG/M2 | RESPIRATION RATE: 17 BRPM | TEMPERATURE: 98.6 F | HEIGHT: 72 IN | SYSTOLIC BLOOD PRESSURE: 153 MMHG | WEIGHT: 214.4 LBS | OXYGEN SATURATION: 98 %

## 2025-01-14 DIAGNOSIS — E66.3 OVERWEIGHT WITH BODY MASS INDEX (BMI) OF 29 TO 29.9 IN ADULT: Chronic | ICD-10-CM

## 2025-01-14 DIAGNOSIS — D72.829 LEUKOCYTOSIS, UNSPECIFIED TYPE: ICD-10-CM

## 2025-01-14 DIAGNOSIS — I10 PRIMARY HYPERTENSION: Chronic | ICD-10-CM

## 2025-01-14 DIAGNOSIS — Z00.00 MEDICARE ANNUAL WELLNESS VISIT, SUBSEQUENT: Primary | ICD-10-CM

## 2025-01-14 PROCEDURE — 3079F DIAST BP 80-89 MM HG: CPT | Performed by: FAMILY MEDICINE

## 2025-01-14 PROCEDURE — 3077F SYST BP >= 140 MM HG: CPT | Performed by: FAMILY MEDICINE

## 2025-01-14 PROCEDURE — 1170F FXNL STATUS ASSESSED: CPT | Performed by: FAMILY MEDICINE

## 2025-01-14 PROCEDURE — G0439 PPPS, SUBSEQ VISIT: HCPCS | Performed by: FAMILY MEDICINE

## 2025-01-14 PROCEDURE — 99213 OFFICE O/P EST LOW 20 MIN: CPT | Performed by: FAMILY MEDICINE

## 2025-01-14 PROCEDURE — 1160F RVW MEDS BY RX/DR IN RCRD: CPT | Performed by: FAMILY MEDICINE

## 2025-01-14 PROCEDURE — 1126F AMNT PAIN NOTED NONE PRSNT: CPT | Performed by: FAMILY MEDICINE

## 2025-01-14 PROCEDURE — 1159F MED LIST DOCD IN RCRD: CPT | Performed by: FAMILY MEDICINE

## 2025-01-14 NOTE — PROGRESS NOTES
"Chief Complaint  Medicare Wellness-subsequent    Subjective    {Problem List  Visit Diagnosis   Encounters  Notes  Medications  Labs  Result Review Imaging  Media :23}    Rian Mckay presents to [facility]  History of Present Illness    Objective   Vital Signs:  /90 (BP Location: Right arm)   Pulse 77   Temp 98.6 °F (37 °C) (Temporal)   Resp 17   Ht 182.9 cm (72.01\")   Wt 97.3 kg (214 lb 6.4 oz)   SpO2 98%   BMI 29.07 kg/m²   Estimated body mass index is 29.07 kg/m² as calculated from the following:    Height as of this encounter: 182.9 cm (72.01\").    Weight as of this encounter: 97.3 kg (214 lb 6.4 oz).            Physical Exam  Vitals reviewed.   Constitutional:       Appearance: He is well-developed and overweight.   HENT:      Head: Normocephalic and atraumatic.      Right Ear: External ear normal.      Left Ear: External ear normal.      Mouth/Throat:      Pharynx: No oropharyngeal exudate.   Eyes:      Conjunctiva/sclera: Conjunctivae normal.      Pupils: Pupils are equal, round, and reactive to light.   Neck:      Vascular: No carotid bruit.   Cardiovascular:      Rate and Rhythm: Normal rate and regular rhythm.      Heart sounds: No murmur heard.     No friction rub. No gallop.   Pulmonary:      Effort: Pulmonary effort is normal.      Breath sounds: Normal breath sounds. No wheezing or rhonchi.   Abdominal:      General: There is no distension.   Skin:     General: Skin is warm and dry.   Neurological:      Mental Status: He is alert and oriented to person, place, and time.      Cranial Nerves: No cranial nerve deficit.      Motor: No weakness.   Psychiatric:         Mood and Affect: Mood and affect normal.         Behavior: Behavior normal.         Thought Content: Thought content normal.         Judgment: Judgment normal.        Result Review :{Labs  Result Review  Imaging  Med Tab  Media  Procedures :23}  {The following data was reviewed by " (Optional):46962}  CMP          12/23/2024    10:51   CMP   Glucose 114    BUN 11    Creatinine 0.94    EGFR 79.4    Sodium 136    Potassium 3.9    Chloride 101    Calcium 10.0    Total Protein 7.6    Albumin 4.3    Globulin 3.3    Total Bilirubin 0.4    Alkaline Phosphatase 50    AST (SGOT) 19    ALT (SGPT) 14    Albumin/Globulin Ratio 1.3    BUN/Creatinine Ratio 11.7    Anion Gap 15.9      CBC          12/23/2024    10:51   CBC   WBC 12.17    RBC 4.91    Hemoglobin 16.3    Hematocrit 47.1    MCV 95.9    MCH 33.2    MCHC 34.6    RDW 12.6    Platelets 344      Lipid Panel          12/23/2024    10:51   Lipid Panel   Total Cholesterol 179    Triglycerides 280    HDL Cholesterol 42    VLDL Cholesterol 47    LDL Cholesterol  90    LDL/HDL Ratio 1.93      TSH          12/23/2024    10:51   TSH   TSH 1.820      {Data reviewed (Optional):35072:::1}          Assessment and Plan {CC Problem List  Visit Diagnosis   ROS  Review (Popup)  Health Maintenance  Quality  BestPractice  Medications  SmartSets  SnapShot Encounters  Media :23}  There are no diagnoses linked to this encounter.       {Time Spent (Optional):99127}  Follow Up {Instructions Charge Capture  Follow-up Communications :23}  No follow-ups on file.  Patient was given instructions and counseling regarding his condition or for health maintenance advice. Please see specific information pulled into the AVS if appropriate.

## 2025-01-14 NOTE — PROGRESS NOTES
Subjective   The ABCs of the Annual Wellness Visit  Medicare Wellness Visit      Rian Mckay is a 84 y.o. patient who presents for a Medicare Wellness Visit.    The following portions of the patient's history were reviewed and   updated as appropriate: allergies, current medications, past family history, past medical history, past social history, past surgical history, and problem list.    Compared to one year ago, the patient's physical   health is the same.  Compared to one year ago, the patient's mental   health is the same.    Recent Hospitalizations:  He was not admitted to the hospital during the last year.     Current Medical Providers:  Patient Care Team:  Abby Montaño DO as PCP - General (Family Medicine)    Outpatient Medications Prior to Visit   Medication Sig Dispense Refill    amLODIPine (NORVASC) 10 MG tablet TAKE ONE TABLET BY MOUTH ONCE DAILY 90 tablet 1    atorvastatin (LIPITOR) 20 MG tablet TAKE 1 TABLET BY MOUTH DAILY FOR 90 DAYS. 90 tablet 1    Calcium Polycarbophil (FIBERCON PO) Take  by mouth.      fenofibrate (TRICOR) 145 MG tablet TAKE 1 TABLET BY MOUTH DAILY. 90 tablet 1    lisinopril (PRINIVIL,ZESTRIL) 40 MG tablet TAKE ONE TABLET BY MOUTH ONCE DAILY FOR 90 DAYS. 90 tablet 1    metoprolol succinate XL (TOPROL-XL) 50 MG 24 hr tablet TAKE 1 TABLET BY MOUTH DAILY  DAYS. 90 tablet 1    Multiple Vitamins-Minerals (ICAPS AREDS 2 PO) Take  by mouth.       No facility-administered medications prior to visit.     No opioid medication identified on active medication list. I have reviewed chart for other potential  high risk medication/s and harmful drug interactions in the elderly.      Aspirin is not on active medication list.  Aspirin use is not indicated based on review of current medical condition/s. Risk of harm outweighs potential benefits.  .    Patient Active Problem List   Diagnosis    Medicare annual wellness visit, subsequent    Primary hypertension    Heart attack    Mixed  "hyperlipidemia    Overweight with body mass index (BMI) of 29 to 29.9 in adult     Advance Care Planning Advance Directive is not on file.  ACP discussion was declined by the patient. Patient does not have an advance directive, information provided.            Objective   Vitals:    25 1257 25 1259   BP: 160/82 153/90   BP Location: Left arm Right arm   Patient Position: Sitting    Cuff Size: Adult    Pulse: 77    Resp: 17    Temp: 98.6 °F (37 °C)    TempSrc: Temporal    SpO2: 98%    Weight: 97.3 kg (214 lb 6.4 oz)    Height: 182.9 cm (72.01\")    PainSc: 0-No pain        Estimated body mass index is 29.07 kg/m² as calculated from the following:    Height as of this encounter: 182.9 cm (72.01\").    Weight as of this encounter: 97.3 kg (214 lb 6.4 oz).                Does the patient have evidence of cognitive impairment? No  Lab Results   Component Value Date    TRIG 280 (H) 2024    HDL 42 2024    LDL 90 2024    VLDL 47 (H) 2024                                                                                                Health  Risk Assessment    Smoking Status:  Social History     Tobacco Use   Smoking Status Former    Current packs/day: 0.00    Average packs/day: 0.3 packs/day for 69.7 years (17.4 ttl pk-yrs)    Types: Cigarettes    Start date: 1953    Quit date: 2022    Years since quittin.3    Passive exposure: Past   Smokeless Tobacco Never   Tobacco Comments    Quit this past sep      Alcohol Consumption:  Social History     Substance and Sexual Activity   Alcohol Use Yes    Alcohol/week: 2.0 standard drinks of alcohol    Types: 2 Shots of liquor per week    Comment: 2 drinks per day       Fall Risk Screen  STEADI Fall Risk Assessment was completed, and patient is at LOW risk for falls.Assessment completed on:2025    Depression Screening   Little interest or pleasure in doing things? Not at all   Feeling down, depressed, or hopeless? Not at all   PHQ-2 " Total Score 0      Health Habits and Functional and Cognitive Screenin/14/2025    12:00 PM   Functional & Cognitive Status   Do you have difficulty preparing food and eating? Yes   Do you have difficulty bathing yourself, getting dressed or grooming yourself? No   Do you have difficulty using the toilet? No   Do you have difficulty moving around from place to place? No   Do you have trouble with steps or getting out of a bed or a chair? No   Current Diet Well Balanced Diet   Dental Exam Not up to date   Eye Exam Up to date   Exercise (times per week) 0 times per week   Current Exercises Include No Regular Exercise   Do you need help using the phone?  Yes   Are you deaf or do you have serious difficulty hearing?  No   Do you need help to go to places out of walking distance? Yes   Do you need help shopping? No   Do you need help preparing meals?  Yes   Do you need help with housework?  Yes   Do you need help with laundry? Yes   Do you need help taking your medications? Yes   Do you need help managing money? No   Do you ever drive or ride in a car without wearing a seat belt? No   Have you felt unusual stress, anger or loneliness in the last month? No   Who do you live with? Alone   If you need help, do you have trouble finding someone available to you? No   Have you been bothered in the last four weeks by sexual problems? No   Do you have difficulty concentrating, remembering or making decisions? No           Age-appropriate Screening Schedule:  Refer to the list below for future screening recommendations based on patient's age, sex and/or medical conditions. Orders for these recommended tests are listed in the plan section. The patient has been provided with a written plan.    Health Maintenance List  Health Maintenance   Topic Date Due    TDAP/TD VACCINES (1 - Tdap) Never done    ZOSTER VACCINE (1 of 2) Never done    COVID-19 Vaccine (3 - 2024- season) 2025 (Originally 2024)    RSV Vaccine -  "Adults (1 - 1-dose 75+ series) 01/14/2026 (Originally 10/8/2014)    Pneumococcal Vaccine 65+ (1 of 1 - PCV) 01/14/2026 (Originally 10/8/2004)    LIPID PANEL  12/23/2025    ANNUAL WELLNESS VISIT  01/14/2026    BMI FOLLOWUP  01/14/2026    INFLUENZA VACCINE  Completed    HEMOGLOBIN A1C  Discontinued                                                                                                                                                CMS Preventative Services Quick Reference  Risk Factors Identified During Encounter  Fall Risk-High or Moderate: Discussed Fall Prevention in the home    The above risks/problems have been discussed with the patient.  Pertinent information has been shared with the patient in the After Visit Summary.  An After Visit Summary and PPPS were made available to the patient.    Follow Up:   Next Medicare Wellness visit to be scheduled in 1 year.         Additional E&M Note during same encounter follows:  Patient has additional, significant, and separately identifiable condition(s)/problem(s) that require work above and beyond the Medicare Wellness Visit     Chief Complaint  Medicare Wellness-subsequent    Subjective   HPI  Rian is also being seen today for additional medical problem/s.    Review of Systems   HENT:  Negative for trouble swallowing.    Eyes:  Negative for visual disturbance.   Respiratory:  Negative for apnea.    Cardiovascular:  Negative for chest pain.   Gastrointestinal:  Negative for blood in stool.   Endocrine: Negative for polyphagia.   Genitourinary:  Negative for dysuria.   Skin:  Negative for color change.   Allergic/Immunologic: Negative for immunocompromised state.   Neurological:  Negative for seizures.   Hematological:  Negative for adenopathy.   Psychiatric/Behavioral:  Negative for behavioral problems.               Objective   Vital Signs:  /90 (BP Location: Right arm)   Pulse 77   Temp 98.6 °F (37 °C) (Temporal)   Resp 17   Ht 182.9 cm (72.01\")   " Wt 97.3 kg (214 lb 6.4 oz)   SpO2 98%   BMI 29.07 kg/m²   Physical Exam  Vitals reviewed.   Constitutional:       Appearance: He is well-developed and overweight.   HENT:      Head: Normocephalic and atraumatic.      Right Ear: External ear normal.      Left Ear: External ear normal.      Mouth/Throat:      Pharynx: No oropharyngeal exudate.   Eyes:      Conjunctiva/sclera: Conjunctivae normal.      Pupils: Pupils are equal, round, and reactive to light.   Neck:      Vascular: No carotid bruit.   Cardiovascular:      Rate and Rhythm: Normal rate and regular rhythm.      Heart sounds: No murmur heard.     No friction rub. No gallop.   Pulmonary:      Effort: Pulmonary effort is normal.      Breath sounds: Normal breath sounds. No wheezing or rhonchi.   Abdominal:      General: There is no distension.   Skin:     General: Skin is warm and dry.   Neurological:      Mental Status: He is alert and oriented to person, place, and time.      Cranial Nerves: No cranial nerve deficit.      Motor: No weakness.   Psychiatric:         Mood and Affect: Mood and affect normal.         Behavior: Behavior normal.         Thought Content: Thought content normal.         Judgment: Judgment normal.             CMP          12/23/2024    10:51   CMP   Glucose 114    BUN 11    Creatinine 0.94    EGFR 79.4    Sodium 136    Potassium 3.9    Chloride 101    Calcium 10.0    Total Protein 7.6    Albumin 4.3    Globulin 3.3    Total Bilirubin 0.4    Alkaline Phosphatase 50    AST (SGOT) 19    ALT (SGPT) 14    Albumin/Globulin Ratio 1.3    BUN/Creatinine Ratio 11.7    Anion Gap 15.9      CBC          12/23/2024    10:51   CBC   WBC 12.17    RBC 4.91    Hemoglobin 16.3    Hematocrit 47.1    MCV 95.9    MCH 33.2    MCHC 34.6    RDW 12.6    Platelets 344      Lipid Panel          12/23/2024    10:51   Lipid Panel   Total Cholesterol 179    Triglycerides 280    HDL Cholesterol 42    VLDL Cholesterol 47    LDL Cholesterol  90    LDL/HDL Ratio 1.93       TSH          12/23/2024    10:51   TSH   TSH 1.820              Assessment and Plan            Medicare annual wellness visit, subsequent         Leukocytosis, unspecified type  I explained to the patient today that I was concerned about his leukocytosis.  I added labs today.  I encouraged him to get them drawn.  Orders:    Peripheral Blood Smear; Future    CBC w AUTO Differential; Future    Primary hypertension  Hypertension is stable and controlled  Continue current treatment regimen.  Dietary sodium restriction.  Weight loss.  Blood pressure will be reassessed in 6 months.                 Follow Up   Return in about 6 months (around 7/14/2025).  Patient was given instructions and counseling regarding his condition or for health maintenance advice. Please see specific information pulled into the AVS if appropriate.

## 2025-01-14 NOTE — ASSESSMENT & PLAN NOTE
Hypertension is stable and controlled  Continue current treatment regimen.  Dietary sodium restriction.  Weight loss.  Blood pressure will be reassessed in 6 months.

## 2025-01-14 NOTE — ASSESSMENT & PLAN NOTE
Patient's (Body mass index is 29.07 kg/m².) indicates that they are overweight with health conditions that include hypertension and dyslipidemias . Weight is improving with lifestyle modifications. BMI is is above average; BMI management plan is completed. We discussed low calorie, low carb based diet program, portion control, and increasing exercise.

## 2025-01-27 RX ORDER — FENOFIBRATE 145 MG/1
145 TABLET, COATED ORAL DAILY
Qty: 90 TABLET | Refills: 1 | Status: SHIPPED | OUTPATIENT
Start: 2025-01-27

## 2025-01-27 RX ORDER — METOPROLOL SUCCINATE 50 MG/1
50 TABLET, EXTENDED RELEASE ORAL DAILY
Qty: 90 TABLET | Refills: 1 | Status: SHIPPED | OUTPATIENT
Start: 2025-01-27 | End: 2025-07-26

## 2025-01-27 RX ORDER — AMLODIPINE BESYLATE 10 MG/1
TABLET ORAL
Qty: 90 TABLET | Refills: 1 | Status: SHIPPED | OUTPATIENT
Start: 2025-01-27

## 2025-01-27 RX ORDER — ATORVASTATIN CALCIUM 20 MG/1
TABLET, FILM COATED ORAL
Qty: 90 TABLET | Refills: 1 | Status: SHIPPED | OUTPATIENT
Start: 2025-01-27

## 2025-01-27 RX ORDER — LISINOPRIL 40 MG/1
TABLET ORAL
Qty: 90 TABLET | Refills: 1 | Status: SHIPPED | OUTPATIENT
Start: 2025-01-27

## 2025-03-02 ENCOUNTER — HOSPITAL ENCOUNTER (INPATIENT)
Facility: HOSPITAL | Age: 85
LOS: 3 days | Discharge: HOME HEALTH | End: 2025-03-05
Attending: EMERGENCY MEDICINE | Admitting: STUDENT IN AN ORGANIZED HEALTH CARE EDUCATION/TRAINING PROGRAM
Payer: MEDICARE

## 2025-03-02 ENCOUNTER — APPOINTMENT (OUTPATIENT)
Dept: CT IMAGING | Facility: HOSPITAL | Age: 85
End: 2025-03-02
Payer: MEDICARE

## 2025-03-02 ENCOUNTER — APPOINTMENT (OUTPATIENT)
Dept: GENERAL RADIOLOGY | Facility: HOSPITAL | Age: 85
End: 2025-03-02
Payer: MEDICARE

## 2025-03-02 DIAGNOSIS — J43.9 PULMONARY EMPHYSEMA, UNSPECIFIED EMPHYSEMA TYPE: Primary | ICD-10-CM

## 2025-03-02 DIAGNOSIS — J96.01 ACUTE HYPOXIC RESPIRATORY FAILURE: ICD-10-CM

## 2025-03-02 LAB
ALBUMIN SERPL-MCNC: 4.2 G/DL (ref 3.5–5.2)
ALBUMIN/GLOB SERPL: 1.3 G/DL
ALP SERPL-CCNC: 45 U/L (ref 39–117)
ALT SERPL W P-5'-P-CCNC: 15 U/L (ref 1–41)
ANION GAP SERPL CALCULATED.3IONS-SCNC: 16.2 MMOL/L (ref 5–15)
ARTERIAL PATENCY WRIST A: POSITIVE
AST SERPL-CCNC: 19 U/L (ref 1–40)
ATMOSPHERIC PRESS: 749.2 MMHG
BASE EXCESS BLDA CALC-SCNC: -1.9 MMOL/L (ref -2–2)
BASOPHILS # BLD AUTO: 0.12 10*3/MM3 (ref 0–0.2)
BASOPHILS NFR BLD AUTO: 1 % (ref 0–1.5)
BDY SITE: ABNORMAL
BILIRUB SERPL-MCNC: 0.2 MG/DL (ref 0–1.2)
BUN SERPL-MCNC: 14 MG/DL (ref 8–23)
BUN/CREAT SERPL: 12.6 (ref 7–25)
CA-I BLDA-SCNC: 1.3 MMOL/L (ref 1.13–1.32)
CALCIUM SPEC-SCNC: 10.2 MG/DL (ref 8.6–10.5)
CHLORIDE BLDA-SCNC: 104 MMOL/L (ref 98–107)
CHLORIDE SERPL-SCNC: 102 MMOL/L (ref 98–107)
CO2 SERPL-SCNC: 20.8 MMOL/L (ref 22–29)
CREAT SERPL-MCNC: 1.11 MG/DL (ref 0.76–1.27)
D-LACTATE SERPL-SCNC: 2.7 MMOL/L
DEPRECATED RDW RBC AUTO: 52.2 FL (ref 37–54)
EGFRCR SERPLBLD CKD-EPI 2021: 65.1 ML/MIN/1.73
EOSINOPHIL # BLD AUTO: 0.27 10*3/MM3 (ref 0–0.4)
EOSINOPHIL NFR BLD AUTO: 2.2 % (ref 0.3–6.2)
ERYTHROCYTE [DISTWIDTH] IN BLOOD BY AUTOMATED COUNT: 14.5 % (ref 12.3–15.4)
FLUAV SUBTYP SPEC NAA+PROBE: NOT DETECTED
FLUBV RNA ISLT QL NAA+PROBE: NOT DETECTED
GAS FLOW AIRWAY: 4 LPM
GEN 5 1HR TROPONIN T REFLEX: 11 NG/L
GLOBULIN UR ELPH-MCNC: 3.3 GM/DL
GLUCOSE BLDC GLUCOMTR-MCNC: 170 MG/DL (ref 70–99)
GLUCOSE SERPL-MCNC: 170 MG/DL (ref 65–99)
HCO3 BLDA-SCNC: 21.4 MMOL/L (ref 22–26)
HCT VFR BLD AUTO: 47.9 % (ref 37.5–51)
HCT VFR BLD CALC: 49 % (ref 38–51)
HEMODILUTION: NO
HGB BLD-MCNC: 15.9 G/DL (ref 13–17.7)
HGB BLDA-MCNC: 16.5 G/DL (ref 12–18)
HOLD SPECIMEN: NORMAL
HOLD SPECIMEN: NORMAL
IMM GRANULOCYTES # BLD AUTO: 0.08 10*3/MM3 (ref 0–0.05)
IMM GRANULOCYTES NFR BLD AUTO: 0.7 % (ref 0–0.5)
LYMPHOCYTES # BLD AUTO: 4.21 10*3/MM3 (ref 0.7–3.1)
LYMPHOCYTES NFR BLD AUTO: 34.5 % (ref 19.6–45.3)
MCH RBC QN AUTO: 32.2 PG (ref 26.6–33)
MCHC RBC AUTO-ENTMCNC: 33.2 G/DL (ref 31.5–35.7)
MCV RBC AUTO: 97 FL (ref 79–97)
MODALITY: ABNORMAL
MONOCYTES # BLD AUTO: 0.67 10*3/MM3 (ref 0.1–0.9)
MONOCYTES NFR BLD AUTO: 5.5 % (ref 5–12)
NEUTROPHILS NFR BLD AUTO: 56.1 % (ref 42.7–76)
NEUTROPHILS NFR BLD AUTO: 6.86 10*3/MM3 (ref 1.7–7)
NRBC BLD AUTO-RTO: 0 /100 WBC (ref 0–0.2)
NT-PROBNP SERPL-MCNC: 277.2 PG/ML (ref 0–1800)
PCO2 BLDA: 32.5 MM HG (ref 35–45)
PH BLDA: 7.43 PH UNITS (ref 7.35–7.45)
PLATELET # BLD AUTO: 270 10*3/MM3 (ref 140–450)
PMV BLD AUTO: 9.5 FL (ref 6–12)
PO2 BLDA: 56.3 MM HG (ref 80–100)
POTASSIUM BLDA-SCNC: 3.6 MMOL/L (ref 3.5–5)
POTASSIUM SERPL-SCNC: 3.5 MMOL/L (ref 3.5–5.2)
PROT SERPL-MCNC: 7.5 G/DL (ref 6–8.5)
QT INTERVAL: 353 MS
QTC INTERVAL: 439 MS
RBC # BLD AUTO: 4.94 10*6/MM3 (ref 4.14–5.8)
RSV RNA NPH QL NAA+NON-PROBE: NOT DETECTED
SAO2 % BLDCOA: 90 % (ref 95–99)
SARS-COV-2 RNA RESP QL NAA+PROBE: NOT DETECTED
SODIUM BLD-SCNC: 139 MMOL/L (ref 131–143)
SODIUM SERPL-SCNC: 139 MMOL/L (ref 136–145)
TROPONIN T NUMERIC DELTA: -3 NG/L
TROPONIN T SERPL HS-MCNC: 14 NG/L
WBC NRBC COR # BLD AUTO: 12.21 10*3/MM3 (ref 3.4–10.8)
WHOLE BLOOD HOLD COAG: NORMAL
WHOLE BLOOD HOLD SPECIMEN: NORMAL

## 2025-03-02 PROCEDURE — 80053 COMPREHEN METABOLIC PANEL: CPT | Performed by: EMERGENCY MEDICINE

## 2025-03-02 PROCEDURE — 84484 ASSAY OF TROPONIN QUANT: CPT | Performed by: EMERGENCY MEDICINE

## 2025-03-02 PROCEDURE — 83605 ASSAY OF LACTIC ACID: CPT

## 2025-03-02 PROCEDURE — 36600 WITHDRAWAL OF ARTERIAL BLOOD: CPT

## 2025-03-02 PROCEDURE — 93005 ELECTROCARDIOGRAM TRACING: CPT

## 2025-03-02 PROCEDURE — 94799 UNLISTED PULMONARY SVC/PX: CPT

## 2025-03-02 PROCEDURE — 85025 COMPLETE CBC W/AUTO DIFF WBC: CPT

## 2025-03-02 PROCEDURE — 82948 REAGENT STRIP/BLOOD GLUCOSE: CPT

## 2025-03-02 PROCEDURE — 82330 ASSAY OF CALCIUM: CPT

## 2025-03-02 PROCEDURE — 83880 ASSAY OF NATRIURETIC PEPTIDE: CPT | Performed by: EMERGENCY MEDICINE

## 2025-03-02 PROCEDURE — 93005 ELECTROCARDIOGRAM TRACING: CPT | Performed by: EMERGENCY MEDICINE

## 2025-03-02 PROCEDURE — 36415 COLL VENOUS BLD VENIPUNCTURE: CPT

## 2025-03-02 PROCEDURE — 71275 CT ANGIOGRAPHY CHEST: CPT

## 2025-03-02 PROCEDURE — 99291 CRITICAL CARE FIRST HOUR: CPT

## 2025-03-02 PROCEDURE — 80051 ELECTROLYTE PANEL: CPT

## 2025-03-02 PROCEDURE — 94640 AIRWAY INHALATION TREATMENT: CPT

## 2025-03-02 PROCEDURE — 25510000001 IOPAMIDOL PER 1 ML: Performed by: EMERGENCY MEDICINE

## 2025-03-02 PROCEDURE — 99223 1ST HOSP IP/OBS HIGH 75: CPT | Performed by: STUDENT IN AN ORGANIZED HEALTH CARE EDUCATION/TRAINING PROGRAM

## 2025-03-02 PROCEDURE — 82803 BLOOD GASES ANY COMBINATION: CPT

## 2025-03-02 PROCEDURE — 71045 X-RAY EXAM CHEST 1 VIEW: CPT

## 2025-03-02 PROCEDURE — 87637 SARSCOV2&INF A&B&RSV AMP PRB: CPT | Performed by: EMERGENCY MEDICINE

## 2025-03-02 RX ORDER — IPRATROPIUM BROMIDE AND ALBUTEROL SULFATE 2.5; .5 MG/3ML; MG/3ML
3 SOLUTION RESPIRATORY (INHALATION) ONCE
Status: COMPLETED | OUTPATIENT
Start: 2025-03-02 | End: 2025-03-02

## 2025-03-02 RX ORDER — SODIUM CHLORIDE 0.9 % (FLUSH) 0.9 %
10 SYRINGE (ML) INJECTION AS NEEDED
Status: DISCONTINUED | OUTPATIENT
Start: 2025-03-02 | End: 2025-03-05 | Stop reason: HOSPADM

## 2025-03-02 RX ORDER — IOPAMIDOL 755 MG/ML
100 INJECTION, SOLUTION INTRAVASCULAR
Status: COMPLETED | OUTPATIENT
Start: 2025-03-02 | End: 2025-03-02

## 2025-03-02 RX ADMIN — IOPAMIDOL 100 ML: 755 INJECTION, SOLUTION INTRAVENOUS at 22:52

## 2025-03-02 RX ADMIN — IPRATROPIUM BROMIDE AND ALBUTEROL SULFATE 3 ML: .5; 3 SOLUTION RESPIRATORY (INHALATION) at 22:10

## 2025-03-02 NOTE — Clinical Note
Level of Care: Telemetry [5]   Diagnosis: Acute hypoxic respiratory failure [1975116]   Certification: I Certify That Inpatient Hospital Services Are Medically Necessary For Greater Than 2 Midnights

## 2025-03-03 ENCOUNTER — APPOINTMENT (OUTPATIENT)
Dept: CARDIOLOGY | Facility: HOSPITAL | Age: 85
End: 2025-03-03
Payer: MEDICARE

## 2025-03-03 PROBLEM — D72.829 LEUKOCYTOSIS: Status: ACTIVE | Noted: 2025-03-03

## 2025-03-03 LAB
ANION GAP SERPL CALCULATED.3IONS-SCNC: 15.6 MMOL/L (ref 5–15)
ANION GAP SERPL CALCULATED.3IONS-SCNC: 18.2 MMOL/L (ref 5–15)
BACTERIA UR QL AUTO: ABNORMAL /HPF
BASOPHILS # BLD AUTO: 0.05 10*3/MM3 (ref 0–0.2)
BASOPHILS # BLD AUTO: 0.06 10*3/MM3 (ref 0–0.2)
BASOPHILS NFR BLD AUTO: 0.2 % (ref 0–1.5)
BASOPHILS NFR BLD AUTO: 0.3 % (ref 0–1.5)
BILIRUB UR QL STRIP: NEGATIVE
BUN SERPL-MCNC: 17 MG/DL (ref 8–23)
BUN SERPL-MCNC: 17 MG/DL (ref 8–23)
BUN/CREAT SERPL: 15 (ref 7–25)
BUN/CREAT SERPL: 17 (ref 7–25)
CALCIUM SPEC-SCNC: 10.7 MG/DL (ref 8.6–10.5)
CALCIUM SPEC-SCNC: 9.6 MG/DL (ref 8.6–10.5)
CHLORIDE SERPL-SCNC: 102 MMOL/L (ref 98–107)
CHLORIDE SERPL-SCNC: 104 MMOL/L (ref 98–107)
CLARITY UR: CLEAR
CO2 SERPL-SCNC: 14.8 MMOL/L (ref 22–29)
CO2 SERPL-SCNC: 17.4 MMOL/L (ref 22–29)
COLOR UR: YELLOW
CREAT SERPL-MCNC: 1 MG/DL (ref 0.76–1.27)
CREAT SERPL-MCNC: 1.13 MG/DL (ref 0.76–1.27)
D-LACTATE SERPL-SCNC: 2.8 MMOL/L (ref 0.5–2)
D-LACTATE SERPL-SCNC: 3.2 MMOL/L (ref 0.5–2)
D-LACTATE SERPL-SCNC: 3.2 MMOL/L (ref 0.5–2)
D-LACTATE SERPL-SCNC: 3.9 MMOL/L (ref 0.5–2)
D-LACTATE SERPL-SCNC: 3.9 MMOL/L (ref 0.5–2)
DEPRECATED RDW RBC AUTO: 51.1 FL (ref 37–54)
DEPRECATED RDW RBC AUTO: 52 FL (ref 37–54)
EGFRCR SERPLBLD CKD-EPI 2021: 63.7 ML/MIN/1.73
EGFRCR SERPLBLD CKD-EPI 2021: 73.8 ML/MIN/1.73
EOSINOPHIL # BLD AUTO: 0 10*3/MM3 (ref 0–0.4)
EOSINOPHIL # BLD AUTO: 0 10*3/MM3 (ref 0–0.4)
EOSINOPHIL NFR BLD AUTO: 0 % (ref 0.3–6.2)
EOSINOPHIL NFR BLD AUTO: 0 % (ref 0.3–6.2)
ERYTHROCYTE [DISTWIDTH] IN BLOOD BY AUTOMATED COUNT: 14.5 % (ref 12.3–15.4)
ERYTHROCYTE [DISTWIDTH] IN BLOOD BY AUTOMATED COUNT: 14.6 % (ref 12.3–15.4)
ETHANOL BLD-MCNC: <10 MG/DL (ref 0–10)
ETHANOL UR QL: <0.01 %
GLUCOSE SERPL-MCNC: 190 MG/DL (ref 65–99)
GLUCOSE SERPL-MCNC: 221 MG/DL (ref 65–99)
GLUCOSE UR STRIP-MCNC: ABNORMAL MG/DL
HCT VFR BLD AUTO: 45.8 % (ref 37.5–51)
HCT VFR BLD AUTO: 46.1 % (ref 37.5–51)
HGB BLD-MCNC: 15.3 G/DL (ref 13–17.7)
HGB BLD-MCNC: 15.6 G/DL (ref 13–17.7)
HGB UR QL STRIP.AUTO: NEGATIVE
HYALINE CASTS UR QL AUTO: ABNORMAL /LPF
IMM GRANULOCYTES # BLD AUTO: 0.1 10*3/MM3 (ref 0–0.05)
IMM GRANULOCYTES # BLD AUTO: 0.11 10*3/MM3 (ref 0–0.05)
IMM GRANULOCYTES NFR BLD AUTO: 0.5 % (ref 0–0.5)
IMM GRANULOCYTES NFR BLD AUTO: 0.6 % (ref 0–0.5)
KETONES UR QL STRIP: NEGATIVE
L PNEUMO1 AG UR QL IA: NEGATIVE
LEUKOCYTE ESTERASE UR QL STRIP.AUTO: ABNORMAL
LYMPHOCYTES # BLD AUTO: 0.89 10*3/MM3 (ref 0.7–3.1)
LYMPHOCYTES # BLD AUTO: 1.1 10*3/MM3 (ref 0.7–3.1)
LYMPHOCYTES NFR BLD AUTO: 4.7 % (ref 19.6–45.3)
LYMPHOCYTES NFR BLD AUTO: 5.1 % (ref 19.6–45.3)
MAGNESIUM SERPL-MCNC: 2 MG/DL (ref 1.6–2.4)
MCH RBC QN AUTO: 31.7 PG (ref 26.6–33)
MCH RBC QN AUTO: 32.8 PG (ref 26.6–33)
MCHC RBC AUTO-ENTMCNC: 33.4 G/DL (ref 31.5–35.7)
MCHC RBC AUTO-ENTMCNC: 33.8 G/DL (ref 31.5–35.7)
MCV RBC AUTO: 94.8 FL (ref 79–97)
MCV RBC AUTO: 97.1 FL (ref 79–97)
MONOCYTES # BLD AUTO: 0.38 10*3/MM3 (ref 0.1–0.9)
MONOCYTES # BLD AUTO: 0.5 10*3/MM3 (ref 0.1–0.9)
MONOCYTES NFR BLD AUTO: 2 % (ref 5–12)
MONOCYTES NFR BLD AUTO: 2.3 % (ref 5–12)
NEUTROPHILS NFR BLD AUTO: 17.43 10*3/MM3 (ref 1.7–7)
NEUTROPHILS NFR BLD AUTO: 19.68 10*3/MM3 (ref 1.7–7)
NEUTROPHILS NFR BLD AUTO: 91.9 % (ref 42.7–76)
NEUTROPHILS NFR BLD AUTO: 92.4 % (ref 42.7–76)
NITRITE UR QL STRIP: NEGATIVE
NRBC BLD AUTO-RTO: 0 /100 WBC (ref 0–0.2)
NRBC BLD AUTO-RTO: 0 /100 WBC (ref 0–0.2)
PH UR STRIP.AUTO: 6 [PH] (ref 5–8)
PLATELET # BLD AUTO: 260 10*3/MM3 (ref 140–450)
PLATELET # BLD AUTO: 261 10*3/MM3 (ref 140–450)
PMV BLD AUTO: 9.4 FL (ref 6–12)
PMV BLD AUTO: 9.6 FL (ref 6–12)
POTASSIUM SERPL-SCNC: 3.9 MMOL/L (ref 3.5–5.2)
POTASSIUM SERPL-SCNC: 4.3 MMOL/L (ref 3.5–5.2)
PROCALCITONIN SERPL-MCNC: 0.04 NG/ML (ref 0–0.25)
PROT UR QL STRIP: NEGATIVE
RBC # BLD AUTO: 4.75 10*6/MM3 (ref 4.14–5.8)
RBC # BLD AUTO: 4.83 10*6/MM3 (ref 4.14–5.8)
RBC # UR STRIP: ABNORMAL /HPF
REF LAB TEST METHOD: ABNORMAL
S PNEUM AG SPEC QL LA: NEGATIVE
SODIUM SERPL-SCNC: 135 MMOL/L (ref 136–145)
SODIUM SERPL-SCNC: 137 MMOL/L (ref 136–145)
SP GR UR STRIP: 1.02 (ref 1–1.03)
SQUAMOUS #/AREA URNS HPF: ABNORMAL /HPF
UROBILINOGEN UR QL STRIP: ABNORMAL
WBC # UR STRIP: ABNORMAL /HPF
WBC NRBC COR # BLD AUTO: 18.87 10*3/MM3 (ref 3.4–10.8)
WBC NRBC COR # BLD AUTO: 21.43 10*3/MM3 (ref 3.4–10.8)

## 2025-03-03 PROCEDURE — 25810000003 SODIUM CHLORIDE 0.9 % SOLUTION: Performed by: STUDENT IN AN ORGANIZED HEALTH CARE EDUCATION/TRAINING PROGRAM

## 2025-03-03 PROCEDURE — 94799 UNLISTED PULMONARY SVC/PX: CPT

## 2025-03-03 PROCEDURE — 83605 ASSAY OF LACTIC ACID: CPT

## 2025-03-03 PROCEDURE — 83735 ASSAY OF MAGNESIUM: CPT | Performed by: INTERNAL MEDICINE

## 2025-03-03 PROCEDURE — 25010000002 CEFEPIME PER 500 MG: Performed by: INTERNAL MEDICINE

## 2025-03-03 PROCEDURE — 36415 COLL VENOUS BLD VENIPUNCTURE: CPT | Performed by: STUDENT IN AN ORGANIZED HEALTH CARE EDUCATION/TRAINING PROGRAM

## 2025-03-03 PROCEDURE — 25010000002 METHYLPREDNISOLONE PER 40 MG: Performed by: STUDENT IN AN ORGANIZED HEALTH CARE EDUCATION/TRAINING PROGRAM

## 2025-03-03 PROCEDURE — 87899 AGENT NOS ASSAY W/OPTIC: CPT | Performed by: INTERNAL MEDICINE

## 2025-03-03 PROCEDURE — 85025 COMPLETE CBC W/AUTO DIFF WBC: CPT | Performed by: STUDENT IN AN ORGANIZED HEALTH CARE EDUCATION/TRAINING PROGRAM

## 2025-03-03 PROCEDURE — 82077 ASSAY SPEC XCP UR&BREATH IA: CPT | Performed by: INTERNAL MEDICINE

## 2025-03-03 PROCEDURE — 94761 N-INVAS EAR/PLS OXIMETRY MLT: CPT

## 2025-03-03 PROCEDURE — 84145 PROCALCITONIN (PCT): CPT | Performed by: INTERNAL MEDICINE

## 2025-03-03 PROCEDURE — 25010000002 METHYLPREDNISOLONE PER 40 MG: Performed by: INTERNAL MEDICINE

## 2025-03-03 PROCEDURE — 87449 NOS EACH ORGANISM AG IA: CPT | Performed by: INTERNAL MEDICINE

## 2025-03-03 PROCEDURE — 81001 URINALYSIS AUTO W/SCOPE: CPT | Performed by: STUDENT IN AN ORGANIZED HEALTH CARE EDUCATION/TRAINING PROGRAM

## 2025-03-03 PROCEDURE — 87040 BLOOD CULTURE FOR BACTERIA: CPT | Performed by: STUDENT IN AN ORGANIZED HEALTH CARE EDUCATION/TRAINING PROGRAM

## 2025-03-03 PROCEDURE — 99233 SBSQ HOSP IP/OBS HIGH 50: CPT | Performed by: INTERNAL MEDICINE

## 2025-03-03 PROCEDURE — 80048 BASIC METABOLIC PNL TOTAL CA: CPT | Performed by: STUDENT IN AN ORGANIZED HEALTH CARE EDUCATION/TRAINING PROGRAM

## 2025-03-03 PROCEDURE — 99223 1ST HOSP IP/OBS HIGH 75: CPT | Performed by: INTERNAL MEDICINE

## 2025-03-03 PROCEDURE — 25810000003 SODIUM CHLORIDE 0.9 % SOLUTION: Performed by: INTERNAL MEDICINE

## 2025-03-03 PROCEDURE — 25010000002 HEPARIN (PORCINE) PER 1000 UNITS: Performed by: STUDENT IN AN ORGANIZED HEALTH CARE EDUCATION/TRAINING PROGRAM

## 2025-03-03 PROCEDURE — 93306 TTE W/DOPPLER COMPLETE: CPT | Performed by: INTERNAL MEDICINE

## 2025-03-03 PROCEDURE — 25010000002 THIAMINE PER 100 MG: Performed by: INTERNAL MEDICINE

## 2025-03-03 PROCEDURE — 93306 TTE W/DOPPLER COMPLETE: CPT

## 2025-03-03 RX ORDER — ARFORMOTEROL TARTRATE 15 UG/2ML
15 SOLUTION RESPIRATORY (INHALATION)
Status: DISCONTINUED | OUTPATIENT
Start: 2025-03-03 | End: 2025-03-05 | Stop reason: HOSPADM

## 2025-03-03 RX ORDER — ASPIRIN 81 MG/1
81 TABLET ORAL DAILY
COMMUNITY

## 2025-03-03 RX ORDER — ATORVASTATIN CALCIUM 20 MG/1
20 TABLET, FILM COATED ORAL NIGHTLY
Status: DISCONTINUED | OUTPATIENT
Start: 2025-03-03 | End: 2025-03-04

## 2025-03-03 RX ORDER — ASPIRIN 81 MG/1
81 TABLET ORAL DAILY
Status: DISCONTINUED | OUTPATIENT
Start: 2025-03-03 | End: 2025-03-04

## 2025-03-03 RX ORDER — IPRATROPIUM BROMIDE AND ALBUTEROL SULFATE 2.5; .5 MG/3ML; MG/3ML
3 SOLUTION RESPIRATORY (INHALATION) EVERY 4 HOURS PRN
Status: DISCONTINUED | OUTPATIENT
Start: 2025-03-03 | End: 2025-03-03 | Stop reason: SDUPTHER

## 2025-03-03 RX ORDER — HEPARIN SODIUM 5000 [USP'U]/ML
5000 INJECTION, SOLUTION INTRAVENOUS; SUBCUTANEOUS EVERY 12 HOURS SCHEDULED
Status: DISCONTINUED | OUTPATIENT
Start: 2025-03-03 | End: 2025-03-03

## 2025-03-03 RX ORDER — AMOXICILLIN 250 MG
2 CAPSULE ORAL 2 TIMES DAILY PRN
Status: DISCONTINUED | OUTPATIENT
Start: 2025-03-03 | End: 2025-03-05 | Stop reason: HOSPADM

## 2025-03-03 RX ORDER — LORAZEPAM 2 MG/1
2 TABLET ORAL
Status: DISCONTINUED | OUTPATIENT
Start: 2025-03-03 | End: 2025-03-05 | Stop reason: HOSPADM

## 2025-03-03 RX ORDER — ONDANSETRON 4 MG/1
4 TABLET, ORALLY DISINTEGRATING ORAL EVERY 6 HOURS PRN
Status: DISCONTINUED | OUTPATIENT
Start: 2025-03-03 | End: 2025-03-05 | Stop reason: HOSPADM

## 2025-03-03 RX ORDER — SODIUM CHLORIDE 9 MG/ML
100 INJECTION, SOLUTION INTRAVENOUS CONTINUOUS
Status: ACTIVE | OUTPATIENT
Start: 2025-03-03 | End: 2025-03-03

## 2025-03-03 RX ORDER — LORAZEPAM 2 MG/ML
2 INJECTION INTRAMUSCULAR
Status: DISCONTINUED | OUTPATIENT
Start: 2025-03-03 | End: 2025-03-05 | Stop reason: HOSPADM

## 2025-03-03 RX ORDER — SODIUM CHLORIDE 9 MG/ML
40 INJECTION, SOLUTION INTRAVENOUS AS NEEDED
Status: DISCONTINUED | OUTPATIENT
Start: 2025-03-03 | End: 2025-03-05 | Stop reason: HOSPADM

## 2025-03-03 RX ORDER — POLYETHYLENE GLYCOL 3350 17 G/17G
17 POWDER, FOR SOLUTION ORAL DAILY PRN
Status: DISCONTINUED | OUTPATIENT
Start: 2025-03-03 | End: 2025-03-05 | Stop reason: HOSPADM

## 2025-03-03 RX ORDER — BISACODYL 10 MG
10 SUPPOSITORY, RECTAL RECTAL DAILY PRN
Status: DISCONTINUED | OUTPATIENT
Start: 2025-03-03 | End: 2025-03-05 | Stop reason: HOSPADM

## 2025-03-03 RX ORDER — BISACODYL 5 MG/1
5 TABLET, DELAYED RELEASE ORAL DAILY PRN
Status: DISCONTINUED | OUTPATIENT
Start: 2025-03-03 | End: 2025-03-05 | Stop reason: HOSPADM

## 2025-03-03 RX ORDER — IPRATROPIUM BROMIDE AND ALBUTEROL SULFATE 2.5; .5 MG/3ML; MG/3ML
3 SOLUTION RESPIRATORY (INHALATION) EVERY 4 HOURS PRN
Status: DISCONTINUED | OUTPATIENT
Start: 2025-03-03 | End: 2025-03-05 | Stop reason: HOSPADM

## 2025-03-03 RX ORDER — SODIUM CHLORIDE 0.9 % (FLUSH) 0.9 %
10 SYRINGE (ML) INJECTION AS NEEDED
Status: DISCONTINUED | OUTPATIENT
Start: 2025-03-03 | End: 2025-03-05 | Stop reason: HOSPADM

## 2025-03-03 RX ORDER — IPRATROPIUM BROMIDE AND ALBUTEROL SULFATE 2.5; .5 MG/3ML; MG/3ML
3 SOLUTION RESPIRATORY (INHALATION)
Status: DISCONTINUED | OUTPATIENT
Start: 2025-03-03 | End: 2025-03-03

## 2025-03-03 RX ORDER — ONDANSETRON 2 MG/ML
4 INJECTION INTRAMUSCULAR; INTRAVENOUS EVERY 6 HOURS PRN
Status: DISCONTINUED | OUTPATIENT
Start: 2025-03-03 | End: 2025-03-05 | Stop reason: HOSPADM

## 2025-03-03 RX ORDER — HEPARIN SODIUM 5000 [USP'U]/ML
5000 INJECTION, SOLUTION INTRAVENOUS; SUBCUTANEOUS 2 TIMES DAILY
Status: DISCONTINUED | OUTPATIENT
Start: 2025-03-04 | End: 2025-03-05 | Stop reason: HOSPADM

## 2025-03-03 RX ORDER — THIAMINE HYDROCHLORIDE 100 MG/ML
200 INJECTION, SOLUTION INTRAMUSCULAR; INTRAVENOUS EVERY 8 HOURS SCHEDULED
Status: DISCONTINUED | OUTPATIENT
Start: 2025-03-06 | End: 2025-03-05 | Stop reason: HOSPADM

## 2025-03-03 RX ORDER — FOLIC ACID 1 MG/1
1 TABLET ORAL DAILY
Status: DISCONTINUED | OUTPATIENT
Start: 2025-03-03 | End: 2025-03-05 | Stop reason: HOSPADM

## 2025-03-03 RX ORDER — BUDESONIDE 0.5 MG/2ML
0.5 INHALANT ORAL
Status: DISCONTINUED | OUTPATIENT
Start: 2025-03-03 | End: 2025-03-05 | Stop reason: HOSPADM

## 2025-03-03 RX ORDER — NITROGLYCERIN 0.4 MG/1
0.4 TABLET SUBLINGUAL
Status: DISCONTINUED | OUTPATIENT
Start: 2025-03-03 | End: 2025-03-03

## 2025-03-03 RX ORDER — METOPROLOL SUCCINATE 50 MG/1
50 TABLET, EXTENDED RELEASE ORAL
Status: DISCONTINUED | OUTPATIENT
Start: 2025-03-03 | End: 2025-03-05 | Stop reason: HOSPADM

## 2025-03-03 RX ORDER — SODIUM CHLORIDE 0.9 % (FLUSH) 0.9 %
10 SYRINGE (ML) INJECTION EVERY 12 HOURS SCHEDULED
Status: DISCONTINUED | OUTPATIENT
Start: 2025-03-03 | End: 2025-03-05 | Stop reason: HOSPADM

## 2025-03-03 RX ORDER — LORAZEPAM 2 MG/ML
1 INJECTION INTRAMUSCULAR
Status: DISCONTINUED | OUTPATIENT
Start: 2025-03-03 | End: 2025-03-05 | Stop reason: HOSPADM

## 2025-03-03 RX ORDER — MULTIPLE VITAMINS W/ MINERALS TAB 9MG-400MCG
1 TAB ORAL DAILY
Status: DISCONTINUED | OUTPATIENT
Start: 2025-03-03 | End: 2025-03-05 | Stop reason: HOSPADM

## 2025-03-03 RX ORDER — LORAZEPAM 0.5 MG/1
1 TABLET ORAL
Status: DISCONTINUED | OUTPATIENT
Start: 2025-03-03 | End: 2025-03-05 | Stop reason: HOSPADM

## 2025-03-03 RX ADMIN — METHYLPREDNISOLONE SODIUM SUCCINATE 40 MG: 40 INJECTION, POWDER, LYOPHILIZED, FOR SOLUTION INTRAMUSCULAR; INTRAVENOUS at 23:31

## 2025-03-03 RX ADMIN — ATORVASTATIN CALCIUM 20 MG: 20 TABLET, FILM COATED ORAL at 21:24

## 2025-03-03 RX ADMIN — SODIUM CHLORIDE 1000 ML: 9 INJECTION, SOLUTION INTRAVENOUS at 05:27

## 2025-03-03 RX ADMIN — Medication 10 ML: at 08:26

## 2025-03-03 RX ADMIN — IPRATROPIUM BROMIDE AND ALBUTEROL SULFATE 3 ML: .5; 3 SOLUTION RESPIRATORY (INHALATION) at 07:41

## 2025-03-03 RX ADMIN — SODIUM CHLORIDE 100 ML/HR: 9 INJECTION, SOLUTION INTRAVENOUS at 13:09

## 2025-03-03 RX ADMIN — SODIUM CHLORIDE 40 ML: 9 INJECTION, SOLUTION INTRAVENOUS at 14:58

## 2025-03-03 RX ADMIN — HEPARIN SODIUM 5000 UNITS: 5000 INJECTION INTRAVENOUS; SUBCUTANEOUS at 03:11

## 2025-03-03 RX ADMIN — ASPIRIN 81 MG: 81 TABLET, COATED ORAL at 11:08

## 2025-03-03 RX ADMIN — HEPARIN SODIUM 5000 UNITS: 5000 INJECTION INTRAVENOUS; SUBCUTANEOUS at 16:08

## 2025-03-03 RX ADMIN — Medication 10 ML: at 21:24

## 2025-03-03 RX ADMIN — METHYLPREDNISOLONE SODIUM SUCCINATE 40 MG: 40 INJECTION, POWDER, LYOPHILIZED, FOR SOLUTION INTRAMUSCULAR; INTRAVENOUS at 08:26

## 2025-03-03 RX ADMIN — SODIUM CHLORIDE 2000 MG: 9 INJECTION, SOLUTION INTRAVENOUS at 16:03

## 2025-03-03 RX ADMIN — IPRATROPIUM BROMIDE AND ALBUTEROL SULFATE 3 ML: .5; 3 SOLUTION RESPIRATORY (INHALATION) at 11:48

## 2025-03-03 RX ADMIN — THIAMINE HYDROCHLORIDE 500 MG: 100 INJECTION, SOLUTION INTRAMUSCULAR; INTRAVENOUS at 14:59

## 2025-03-03 RX ADMIN — METOPROLOL SUCCINATE 50 MG: 50 TABLET, EXTENDED RELEASE ORAL at 11:08

## 2025-03-03 RX ADMIN — SODIUM CHLORIDE 2000 MG: 9 INJECTION, SOLUTION INTRAVENOUS at 06:20

## 2025-03-03 RX ADMIN — THIAMINE HYDROCHLORIDE 500 MG: 100 INJECTION, SOLUTION INTRAMUSCULAR; INTRAVENOUS at 23:00

## 2025-03-03 RX ADMIN — SODIUM CHLORIDE 2000 MG: 9 INJECTION, SOLUTION INTRAVENOUS at 23:30

## 2025-03-03 RX ADMIN — METHYLPREDNISOLONE SODIUM SUCCINATE 40 MG: 40 INJECTION, POWDER, LYOPHILIZED, FOR SOLUTION INTRAMUSCULAR; INTRAVENOUS at 14:59

## 2025-03-03 RX ADMIN — Medication 1 TABLET: at 11:08

## 2025-03-03 RX ADMIN — ARFORMOTEROL TARTRATE 15 MCG: 15 SOLUTION RESPIRATORY (INHALATION) at 18:44

## 2025-03-03 RX ADMIN — BUDESONIDE 0.5 MG: 0.5 INHALANT RESPIRATORY (INHALATION) at 18:43

## 2025-03-03 RX ADMIN — FOLIC ACID 1 MG: 1 TABLET ORAL at 11:08

## 2025-03-03 NOTE — THERAPY EVALUATION
Respiratory Therapist Broncho-Pulmonary Hygiene Progress Note      Patient Name:  Rian Mckay  YOB: 1940    Rian Mckay meets the qualification for Level 1 of the Bronco-Pulmonary Hygiene Protocol. This was based on my daily patient assessment and includes review of chest x-ray results, cough ability and quality, oxygenation, secretions or risk for secretion development and patient mobility.     Broncho-Pulmonary Hygiene Assessment:    Level of Movement: Actively changing positions without assistance  Alert/ oriented/ cooperative    Breath Sounds: Clear to slightly diminished    Cough: Strong, effective    Chest X-Ray: Possible signs of consolidation and/or atelectasis or clear.     Sputum Productions: None or small amount of thin or watery secretions with effective cough    History and Physical: Chronic condition    SpO2 to Oxygen Need: greater than 92% on room air or  less than 3L nasal canula    Current SpO2 is: 95% on 2lpm NC    Based on this information, I have completed the following interventions: Teach/Instruct patient on cough and deep breathe      Electronically signed by Brenda Zhang RRT, 03/03/25, 5:23 PM EST.

## 2025-03-03 NOTE — PROGRESS NOTES
[facility]   Hospitalist Progress Note  Date: 3/3/2025  Patient Name: Rian Mckay  : 1940  MRN: [id number]  Date of admission: 3/2/2025      Subjective   Subjective     Chief Complaint: Follow up for shortness of breath    Summary: 84-year-old male with emphysema, hypertension, coronary artery disease with remote history of stenting who presented with shortness of breath.  SpO2 at home reported to be in the 80s.  On presentation SpO2 was 87% on room air.  WBC 12.2, lactate 2.7, CO2 20.8, anion gap 16.  ABG pH 7.4, pCO2 32.5, pO2 56 on 4 L nasal cannula.  CT angiogram chest no PE, changes of emphysema and reported pleural calcified plaques in the left hemithorax.    Interval Followup:   No acute events overnight  Resting comfortably this morning, weaned to 2 L nasal cannula, denies trouble breathing, no productive cough or chest pain, no fever or chills    Objective   Objective     Vitals:   Temp:  [97.7 °F (36.5 °C)-97.9 °F (36.6 °C)] 97.9 °F (36.6 °C)  Heart Rate:  [] 104  Resp:  [18-21] 20  BP: (130-172)/(70-95) 168/95  Flow (L/min) (Oxygen Therapy):  [2-4] 2  Physical Exam    Constitutional: conversant, NAD   Respiratory:  nonlabored respirations    Cardiovascular:  RRR, no edema   Gastrointestinal: soft, nondistended   Neurologic: Alert, speech clear   Skin: Extremities warm    Result Review    Result Review:  I have personally reviewed the following over the last 24 hours (07:00 to 07:00) and agree with the following findings  [x]  Laboratory  CBC          2024    10:51 3/2/2025    21:25 3/3/2025    03:14 3/3/2025    06:24   CBC   WBC 12.17  12.21  18.87  21.43    RBC 4.91  4.94  4.83  4.75    Hemoglobin 16.3  15.9  15.3  15.6    Hematocrit 47.1  47.9  45.8  46.1    MCV 95.9  97.0  94.8  97.1    MCH 33.2  32.2  31.7  32.8    MCHC 34.6  33.2  33.4  33.8    RDW 12.6  14.5  14.6  14.5    Platelets 344  270  260  261      CMP          2024    10:51 3/2/2025    21:25 3/3/2025     03:14 3/3/2025    06:24   CMP   Glucose 114  170  190  221    BUN 11  14  17  17    Creatinine 0.94  1.11  1.13  1.00    EGFR 79.4  65.1  63.7  73.8    Sodium 136  139  137  135    Potassium 3.9  3.5  4.3  3.9    Chloride 101  102  104  102    Calcium 10.0  10.2  10.7  9.6    Total Protein 7.6  7.5      Albumin 4.3  4.2      Globulin 3.3  3.3      Total Bilirubin 0.4  0.2      Alkaline Phosphatase 50  45      AST (SGOT) 19  19      ALT (SGPT) 14  15      Albumin/Globulin Ratio 1.3  1.3      BUN/Creatinine Ratio 11.7  12.6  15.0  17.0    Anion Gap 15.9  16.2  18.2  15.6      [x]  Microbiology  [x]  Radiology   CT Angiogram Chest Pulmonary Embolism    Result Date: 3/2/2025  CT ANGIOGRAM CHEST PULMONARY EMBOLISM Date of Exam: 3/2/2025 10:50 PM EST Indication: sob. Comparison: None available. Technique: Axial CT images were obtained of the chest after the uneventful intravenous administration of iodinated contrast utilizing pulmonary embolism protocol.  Reconstructed coronal and sagittal images were also obtained. Automated exposure control and iterative construction methods were used. Findings: No pulmonary embolism or aortic dissection is identified. There is atherosclerotic disease and coronary artery disease. There is dilatation of the mid ascending thoracic aorta at 3.5 cm. There is dilatation of the distal thoracic aorta at the level of the diaphragmatic hiatus at 3.3 cm. Visualized abdominal aorta through the renal arteries is normal in caliber. Ascending aorta is normal in caliber at 3.7 cm. No pleural or pericardial effusion is seen. There is no adenopathy by size criteria. Small hiatal hernia is present. No acute findings in the visualized upper abdomen. Lung windows demonstrate emphysema. Calcified pleural plaques in the left hemithorax could reflect prior asbestos exposure. There are areas of bandlike scarring/atelectasis in both lungs. Granulomatous calcifications are present in both lungs. No definite  evidence of acute pneumonia. No evidence of pulmonary edema. No pneumothorax.     1.No pulmonary embolism or aortic dissection identified. 2.Emphysema. No evidence of acute pneumonia or pulmonary edema. 3.Calcified pleural plaques in the left hemithorax could reflect prior asbestos exposure. 4.Atherosclerotic disease and coronary artery disease. 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 Consider follow-up examination in 1 year. Electronically Signed: Sridhar Lopez MD  3/2/2025 11:30 PM EST  Workstation ID: [id number]    XR Chest 1 View    Result Date: 3/2/2025  AP PORTABLE CHEST  HISTORY: Shortness of air.  COMPARISON: 1/24/2021  TECHNIQUE: AP portable chest x-ray.  FINDINGS: Cardiac and mediastinal contours are normal. There is atherosclerotic disease in the aorta. Pulmonary emphysema is present. Interstitial changes in the lower lung zones have the appearance of scarring/atelectasis rather than pneumonia. Correlate clinically in this regard. The mid to upper lung zones are clear. Pulmonary vascularity is normal. There is no pneumothorax.      Emphysema with probable scarring or atelectasis in the lower lung zones. Pneumonia should be excluded clinically.  3/2/2025 9:19 PM by Dr. Sridhar Lopez MD on Workstation: [id number]       [x]  EKG/Telemetry monitor personally reviewed and independently interpreted: Sinus tachycardia with PVCs  []  Cardiology/Vascular   []  Pathology  []  Old records  []  Other:    Assessment & Plan   Assessment / Plan     Assessment/Plan:  Hypoxemia  Emphysema on CT  Pleural plaques  Lactic acidosis, clinically significant  Anion gap metabolic acidosis  CAD with prior PCI  Essential hypertension  Chronic alcohol use       Suspected secondary to underlying chronic lung disease in the setting of longstanding tobacco use.  Over 70-pack-year history, reports quitting several months ago.  Has not had PFTs previously.  Also with pleural plaques on CT chest.  Will consult  pulmonology, discussed with Dr. Keating  Continue scheduled DuoNebs  Continue IV steroids  Check Pro-Jonathan, strep and Legionella urinary antigen. Follow-up blood cultures.  On cefepime  Give additional 1 L normal saline. Trend lactate level until normalized  Chest pain-free.  Troponin negative x 2.  proBNP normal.  Does not look fluid overloaded. Restart baby aspirin and statin  Restart home Toprol-XL 50 mg daily  Reports longstanding daily alcohol use, ~1 pint daily for last 40 years.  Denies history of withdrawals.  Last drink was yesterday.  Does not appear acutely intoxicated.  Check alcohol level.  Placed on CIWA.  Start high-dose IV thiamine, folic acid and MVI.  As needed Ativan per CIWA protocol  VTE prophylaxis: Subcu heparin 5000 units every 12 hours  CBC, BMP, magnesium and phosphorus level in AM    Discussed plan with RN.    VTE Prophylaxis:  Pharmacologic VTE prophylaxis orders are present.      CODE STATUS:   Code Status (Patient has no pulse and is not breathing): CPR (Attempt to Resuscitate)  Medical Interventions (Patient has pulse or is breathing): Full Support    I spent greater than 50 minutes minutes of time for evaluation and management of this patient including review of chart, labs pertinent imaging available as well as medical decision making and discussion with physicians, staff and patient.     Electronically signed by Carlos Earl DO, 03/03/25, 10:03 AM EST.

## 2025-03-03 NOTE — ED PROVIDER NOTES
Time: 10:00 PM EST  Date of encounter:  3/2/2025  Independent Historian/Clinical History and Information was obtained by:   Patient    History is limited by: N/A    Chief Complaint: shortness of breath      History of Present Illness:  Patient is a 84 y.o. year old male who presents to the emergency department for evaluation of shortness of breath.   Patient states he was doing dishes when he suddenly had shortness of breath.  According to his daughter O2 sats dropped to the low 80s.  He received a DuoNeb en route to the emergency department.  Denies any chest pain.  No fever, cough, congestion.  He does have a history of coronary artery disease.  Denies any lower extremity edema.  No diagnosis of COPD or asthma.  He does have a history of smoking.      Patient Care Team  Primary Care Provider: Abby Montaño DO    Past Medical History:     No Known Allergies  Past Medical History:   Diagnosis Date    Heart attack     HTN (hypertension)     SOB (shortness of breath)      Past Surgical History:   Procedure Laterality Date    CARDIAC SURGERY      OTHER SURGICAL HISTORY      Artificial joints/limbs     Family History   Problem Relation Age of Onset    Heart disease Mother     Osteoporosis Mother     Heart disease Father        Home Medications:  Prior to Admission medications    Medication Sig Start Date End Date Taking? Authorizing Provider   amLODIPine (NORVASC) 10 MG tablet TAKE ONE TABLET BY MOUTH ONCE DAILY 1/27/25   Abby Montaño DO   atorvastatin (LIPITOR) 20 MG tablet TAKE 1 TABLET BY MOUTH DAILY FOR 90 DAYS. 1/27/25   Abby Montaño DO   Calcium Polycarbophil (FIBERCON PO) Take  by mouth.    Provider, MD Elias   fenofibrate (TRICOR) 145 MG tablet TAKE 1 TABLET BY MOUTH DAILY. 1/27/25   Abby Montaño DO   lisinopril (PRINIVIL,ZESTRIL) 40 MG tablet TAKE ONE TABLET BY MOUTH ONCE DAILY FOR 90 DAYS. 1/27/25   Abby Montaño DO   metoprolol succinate XL (TOPROL-XL) 50 MG 24 hr tablet TAKE 1 TABLET BY MOUTH  "DAILY  DAYS. 25  Abby Montaño, DO   Multiple Vitamins-Minerals (ICAPS AREDS 2 PO) Take  by mouth.    Provider, Historical, MD        Social History:   Social History     Tobacco Use    Smoking status: Former     Current packs/day: 0.00     Average packs/day: 0.3 packs/day for 69.7 years (17.4 ttl pk-yrs)     Types: Cigarettes     Start date: 1953     Quit date: 2022     Years since quittin.5     Passive exposure: Past    Smokeless tobacco: Never    Tobacco comments:     Quit this past sep    Vaping Use    Vaping status: Never Used   Substance Use Topics    Alcohol use: Yes     Alcohol/week: 2.0 standard drinks of alcohol     Types: 2 Shots of liquor per week     Comment: 2 drinks per day    Drug use: Never         Review of Systems:  Review of Systems   Constitutional:  Negative for chills and fever.   HENT:  Negative for congestion, ear pain and sore throat.    Eyes:  Negative for pain.   Respiratory:  Positive for shortness of breath. Negative for cough and chest tightness.    Cardiovascular:  Negative for chest pain.   Gastrointestinal:  Negative for abdominal pain, diarrhea, nausea and vomiting.   Genitourinary:  Negative for flank pain and hematuria.   Musculoskeletal:  Negative for joint swelling.   Skin:  Negative for pallor.   Neurological:  Negative for seizures and headaches.   All other systems reviewed and are negative.       Physical Exam:  /80   Pulse 100   Temp 97.7 °F (36.5 °C) (Oral)   Resp 20   Ht 188 cm (74\")   Wt 98.8 kg (217 lb 13 oz)   SpO2 91%   BMI 27.97 kg/m²     Physical Exam  Constitutional:       Appearance: Normal appearance.   HENT:      Head: Normocephalic and atraumatic.      Nose: Nose normal.      Mouth/Throat:      Mouth: Mucous membranes are moist.   Eyes:      Extraocular Movements: Extraocular movements intact.      Conjunctiva/sclera: Conjunctivae normal.      Pupils: Pupils are equal, round, and reactive to light.   Cardiovascular: "      Rate and Rhythm: Normal rate and regular rhythm.      Pulses: Normal pulses.      Heart sounds: Normal heart sounds.   Pulmonary:      Effort: Pulmonary effort is normal.      Breath sounds: Wheezing present.   Abdominal:      General: There is no distension.      Palpations: Abdomen is soft.      Tenderness: There is no abdominal tenderness.   Musculoskeletal:         General: Normal range of motion.      Cervical back: Normal range of motion.   Skin:     General: Skin is warm and dry.      Capillary Refill: Capillary refill takes less than 2 seconds.   Neurological:      General: No focal deficit present.      Mental Status: He is alert and oriented to person, place, and time. Mental status is at baseline.   Psychiatric:         Mood and Affect: Mood normal.         Behavior: Behavior normal.                    Medical Decision Making:      Comorbidities that affect care:    Coronary Artery Disease, Hypertension    External Notes reviewed:    Previous Clinic Note: Patient seen by his PCP on 1/14/2025 for Medicare annual wellness visit, leukocytosis, hypertension      The following orders were placed and all results were independently analyzed by me:  Orders Placed This Encounter   Procedures    COVID PRE-OP / PRE-PROCEDURE SCREENING ORDER (NO ISOLATION) - Swab, Nasopharynx    COVID-19, FLU A/B, RSV PCR 1 HR TAT - Swab, Nasopharynx    XR Chest 1 View    CT Angiogram Chest Pulmonary Embolism    [place] Draw    Comprehensive Metabolic Panel    BNP    High Sensitivity Troponin T    CBC Auto Differential    Arterial Blood Gas,H&H,Lytes,Lactate    High Sensitivity Troponin T 1Hr    Blood Gas, Arterial -    STAT Lactic Acid, Reflex    NPO Diet NPO Type: Strict NPO    Undress & Gown    Continuous Pulse Oximetry    Vital Signs    Code Status and Medical Interventions: CPR (Attempt to Resuscitate); Full Support    Inpatient Hospitalist Consult    Oxygen Therapy- Nasal Cannula; Titrate 1-6 LPM Per SpO2; 90 - 95%    POC  Glucose Once    POC Lactate    POC Electrolyte Panel    ECG 12 Lead ED Triage Standing Order; SOA    Insert Peripheral IV    Inpatient Admission    CBC & Differential    Green Top (Gel)    Lavender Top    Gold Top - SST    Light Blue Top       Medications Given in the Emergency Department:  Medications   sodium chloride 0.9 % flush 10 mL (has no administration in time range)   ipratropium-albuterol (DUO-NEB) nebulizer solution 3 mL (3 mL Nebulization Given 3/2/25 2210)   iopamidol (ISOVUE-370) 76 % injection 100 mL (100 mL Intravenous Given 3/2/25 2252)        ED Course:    ED Course as of 03/03/25 0020   Sun Mar 02, 2025   2204 ECG 12 Lead ED Triage Standing Order; SOA  Sinus rhythm with a rate of 93.  Prolonged TX interval.  Nonspecific T wave abnormality.  Normal QTc.  No definite ST elevation.  EKG interpreted by me [LD]      ED Course User Index  [LD] Peyman Lynne MD       Labs:    Lab Results (last 24 hours)       Procedure Component Value Units Date/Time    CBC & Differential [[id number]]  (Abnormal) Collected: 03/02/25 2125    Specimen: Blood Updated: 03/02/25 2134    Narrative:      The following orders were created for panel order CBC & Differential.  Procedure                               Abnormality         Status                     ---------                               -----------         ------                     CBC Auto Differential[[id number]]        Abnormal            Final result                 Please view results for these tests on the individual orders.    Comprehensive Metabolic Panel [[id number]]  (Abnormal) Collected: 03/02/25 2125    Specimen: Blood Updated: 03/02/25 2156     Glucose 170 mg/dL      BUN 14 mg/dL      Creatinine 1.11 mg/dL      Sodium 139 mmol/L      Potassium 3.5 mmol/L      Chloride 102 mmol/L      CO2 20.8 mmol/L      Calcium 10.2 mg/dL      Total Protein 7.5 g/dL      Albumin 4.2 g/dL      ALT (SGPT) 15 U/L      AST (SGOT) 19 U/L      Alkaline Phosphatase 45  U/L      Total Bilirubin 0.2 mg/dL      Globulin 3.3 gm/dL      A/G Ratio 1.3 g/dL      BUN/Creatinine Ratio 12.6     Anion Gap 16.2 mmol/L      eGFR 65.1 mL/min/1.73     Narrative:      GFR Categories in Chronic Kidney Disease (CKD)      GFR Category          GFR (mL/min/1.73)    Interpretation  G1                     90 or greater         Normal or high (1)  G2                      60-89                Mild decrease (1)  G3a                   45-59                Mild to moderate decrease  G3b                   30-44                Moderate to severe decrease  G4                    15-29                Severe decrease  G5                    14 or less           Kidney failure          (1)In the absence of evidence of kidney disease, neither GFR category G1 or G2 fulfill the criteria for CKD.    eGFR calculation 2021 CKD-EPI creatinine equation, which does not include race as a factor    BNP [[id number]]  (Normal) Collected: 03/02/25 2125    Specimen: Blood Updated: 03/02/25 2154     proBNP 277.2 pg/mL     Narrative:      This assay is used as an aid in the diagnosis of individuals suspected of having heart failure. It can be used as an aid in the diagnosis of acute decompensated heart failure (ADHF) in patients presenting with signs and symptoms of ADHF to the emergency department (ED). In addition, NT-proBNP of <300 pg/mL indicates ADHF is not likely.    Age Range Result Interpretation  NT-proBNP Concentration (pg/mL:      <50             Positive            >450                   Gray                 300-450                    Negative             <300    50-75           Positive            >900                  Gray                300-900                  Negative            <300      >75             Positive            >1800                  Gray                300-1800                  Negative            <300    High Sensitivity Troponin T [[id number]]  (Normal) Collected: 03/02/25 2125    Specimen: Blood  Updated: 03/02/25 2156     HS Troponin T 14 ng/L     Narrative:      High Sensitive Troponin T Reference Range:  <14.0 ng/L- Negative Female for AMI  <22.0 ng/L- Negative Male for AMI  >=14 - Abnormal Female indicating possible myocardial injury.  >=22 - Abnormal Male indicating possible myocardial injury.   Clinicians would have to utilize clinical acumen, EKG, Troponin, and serial changes to determine if it is an Acute Myocardial Infarction or myocardial injury due to an underlying chronic condition.         CBC Auto Differential [[id number]]  (Abnormal) Collected: 03/02/25 2125    Specimen: Blood Updated: 03/02/25 2134     WBC 12.21 10*3/mm3      RBC 4.94 10*6/mm3      Hemoglobin 15.9 g/dL      Hematocrit 47.9 %      MCV 97.0 fL      MCH 32.2 pg      MCHC 33.2 g/dL      RDW 14.5 %      RDW-SD 52.2 fl      MPV 9.5 fL      Platelets 270 10*3/mm3      Neutrophil % 56.1 %      Lymphocyte % 34.5 %      Monocyte % 5.5 %      Eosinophil % 2.2 %      Basophil % 1.0 %      Immature Grans % 0.7 %      Neutrophils, Absolute 6.86 10*3/mm3      Lymphocytes, Absolute 4.21 10*3/mm3      Monocytes, Absolute 0.67 10*3/mm3      Eosinophils, Absolute 0.27 10*3/mm3      Basophils, Absolute 0.12 10*3/mm3      Immature Grans, Absolute 0.08 10*3/mm3      nRBC 0.0 /100 WBC     COVID PRE-OP / PRE-PROCEDURE SCREENING ORDER (NO ISOLATION) - Swab, Nasopharynx [[id number]]  (Normal) Collected: 03/02/25 2125    Specimen: Swab from Nasopharynx Updated: 03/02/25 2220    Narrative:      The following orders were created for panel order COVID PRE-OP / PRE-PROCEDURE SCREENING ORDER (NO ISOLATION) - Swab, Nasopharynx.  Procedure                               Abnormality         Status                     ---------                               -----------         ------                     COVID-19, FLU A/B, RSV P...[[id number]]  Normal              Final result                 Please view results for these tests on the individual orders.    COVID-19,  FLU A/B, RSV PCR 1 HR TAT - Swab, Nasopharynx [[id number]]  (Normal) Collected: 03/02/25 2125    Specimen: Swab from Nasopharynx Updated: 03/02/25 2220     COVID19 Not Detected     Influenza A PCR Not Detected     Influenza B PCR Not Detected     RSV, PCR Not Detected    Narrative:      Fact sheet for providers: https://www.fda.gov/media/544070/download    Fact sheet for patients: https://www.fda.gov/media/968412/download    Test performed by PCR.    POC Glucose Once [[id number]]  (Abnormal) Collected: 03/02/25 2210    Specimen: Arterial Blood Updated: 03/02/25 2213     Glucose 170 mg/dL      Comment: Serial Number: [id number]:  [id number]       Blood Gas, Arterial - [[id number]]  (Abnormal) Collected: 03/02/25 2210    Specimen: Arterial Blood Updated: 03/02/25 2213     Site Left Radial     Aidan's Test Positive     pH, Arterial 7.428 pH units      pCO2, Arterial 32.5 mm Hg      pO2, Arterial 56.3 mm Hg      HCO3, Arterial 21.4 mmol/L      Base Excess, Arterial -1.9 mmol/L      Comment: Serial Number: [id number]:  [id number]        O2 Saturation, Arterial 90.0 %      Hemoglobin, Blood Gas 16.5 g/dL      Hematocrit, Blood Gas 49.0 %      Barometric Pressure for Blood Gas 749.2000 mmHg      Modality Cannula     Flow Rate 4.0000 lpm      Hemodilution No    POC Lactate [[id number]]  (Abnormal) Collected: 03/02/25 2210    Specimen: Arterial Blood Updated: 03/02/25 2213     Lactate 2.7 mmol/L      Comment: Serial Number: [id number]:  [id number]       POC Electrolyte Panel [[id number]]  (Normal) Collected: 03/02/25 2210    Specimen: Arterial Blood Updated: 03/02/25 2213     Sodium 139 mmol/L      POC Potassium 3.6 mmol/L      Chloride 104 mmol/L      Ionized Calcium 1.30 mmol/L      Comment: Serial Number: [id number]:  [id number]       High Sensitivity Troponin T 1Hr [[id number]]  (Normal) Collected: 03/02/25 2302    Specimen: Blood Updated: 03/02/25 2325     HS Troponin T 11 ng/L      Troponin T Numeric Delta -3 ng/L     Narrative:       High Sensitive Troponin T Reference Range:  <14.0 ng/L- Negative Female for AMI  <22.0 ng/L- Negative Male for AMI  >=14 - Abnormal Female indicating possible myocardial injury.  >=22 - Abnormal Male indicating possible myocardial injury.   Clinicians would have to utilize clinical acumen, EKG, Troponin, and serial changes to determine if it is an Acute Myocardial Infarction or myocardial injury due to an underlying chronic condition.                  Imaging:    CT Angiogram Chest Pulmonary Embolism    Result Date: 3/2/2025  CT ANGIOGRAM CHEST PULMONARY EMBOLISM Date of Exam: 3/2/2025 10:50 PM EST Indication: sob. Comparison: None available. Technique: Axial CT images were obtained of the chest after the uneventful intravenous administration of iodinated contrast utilizing pulmonary embolism protocol.  Reconstructed coronal and sagittal images were also obtained. Automated exposure control and iterative construction methods were used. Findings: No pulmonary embolism or aortic dissection is identified. There is atherosclerotic disease and coronary artery disease. There is dilatation of the mid ascending thoracic aorta at 3.5 cm. There is dilatation of the distal thoracic aorta at the level of the diaphragmatic hiatus at 3.3 cm. Visualized abdominal aorta through the renal arteries is normal in caliber. Ascending aorta is normal in caliber at 3.7 cm. No pleural or pericardial effusion is seen. There is no adenopathy by size criteria. Small hiatal hernia is present. No acute findings in the visualized upper abdomen. Lung windows demonstrate emphysema. Calcified pleural plaques in the left hemithorax could reflect prior asbestos exposure. There are areas of bandlike scarring/atelectasis in both lungs. Granulomatous calcifications are present in both lungs. No definite evidence of acute pneumonia. No evidence of pulmonary edema. No pneumothorax.     1.No pulmonary embolism or aortic dissection identified.  2.Emphysema. No evidence of acute pneumonia or pulmonary edema. 3.Calcified pleural plaques in the left hemithorax could reflect prior asbestos exposure. 4.Atherosclerotic disease and coronary artery disease. 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 Consider follow-up examination in 1 year. Electronically Signed: Sridhar Lopez MD  3/2/2025 11:30 PM EST  Workstation ID: [id number]    XR Chest 1 View    Result Date: 3/2/2025  AP PORTABLE CHEST  HISTORY: Shortness of air.  COMPARISON: 1/24/2021  TECHNIQUE: AP portable chest x-ray.  FINDINGS: Cardiac and mediastinal contours are normal. There is atherosclerotic disease in the aorta. Pulmonary emphysema is present. Interstitial changes in the lower lung zones have the appearance of scarring/atelectasis rather than pneumonia. Correlate clinically in this regard. The mid to upper lung zones are clear. Pulmonary vascularity is normal. There is no pneumothorax.      Emphysema with probable scarring or atelectasis in the lower lung zones. Pneumonia should be excluded clinically.  3/2/2025 9:19 PM by Dr. Sridhar Lopez MD on Workstation: [id number]         Differential Diagnosis and Discussion:    Dyspnea: Differential diagnosis includes but is not limited to metabolic acidosis, neurological disorders, psychogenic, asthma, pneumothorax, upper airway obstruction, COPD, pneumonia, noncardiogenic pulmonary edema, interstitial lung disease, anemia, congestive heart failure, and pulmonary embolism    PROCEDURES:    Labs were collected in the emergency department and all labs were reviewed and interpreted by me.  X-ray were performed in the emergency department and all X-ray impressions were independently interpreted by me.  An EKG was performed and the EKG was interpreted by me.  CT scan was performed in the emergency department and the CT scan radiology impression was interpreted by me.    ECG 12 Lead ED Triage Standing Order; SOA   Preliminary Result   HEART  RATE=93  bpm   RR Lbbsdfse=399  ms   WA Strzjwgj=584  ms   P Horizontal Axis=-58  deg   P Front Axis=-78  deg   QRSD Zjqaquew=965  ms   QT Dpzpxkvp=749  ms   ZFdW=118  ms   QRS Axis=38  deg   T Wave Axis=  deg   - ABNORMAL ECG -   Sinus or ectopic atrial rhythm   Nonspecific intraventricular conduction delay   Nonspecific T abnormalities, inferior leads   Date and Time of Study:2025-03-02 21:04:10          Procedures    MDM  Number of Diagnoses or Management Options  Acute hypoxic respiratory failure  Diagnosis management comments: Patient presented to the emergency department with shortness of breath. O2 sat in the 80s. Patient currently requiring 4L nasal canula to keep O2 sat above 90. Chest xray did not show acute findings. CT was subsequently ordered that did not show acute findings with persistent O2 requirements discussed patient with hospitalist and will admit for further care       Amount and/or Complexity of Data Reviewed  Clinical lab tests: reviewed  Tests in the radiology section of CPT®: reviewed  Review and summarize past medical records: yes  Independent visualization of images, tracings, or specimens: yes    Risk of Complications, Morbidity, and/or Mortality  Presenting problems: moderate  Management options: moderate             Total Critical Care time of 35 minutes. Total critical care time documented does not include time spent on separately billed procedures for services of nurses or physician assistants. I personally saw and examined the patient. I have reviewed all diagnostic interpretations and treatment plans as written. I was present for the key portions of any procedures performed and the inclusive time noted in any critical care statement. Critical care time includes patient management by me, time spent at the patients bedside,  time to review lab and imaging results, discussing patient care, documentation in the medical record, and time spent with family or caregiver.          Patient  Care Considerations:    SEPSIS was considered but is NOT present in the emergency department as SIRS criteria is not present.      Consultants/Shared Management Plan:    Hospitalist: I have discussed the case with Dr Cazares who agrees to accept the patient for admission.    Social Determinants of Health:    Patient is independent, reliable, and has access to care.       Disposition and Care Coordination:    Admit:   Through independent evaluation of the patient's history, physical, and imperical data, the patient meets criteria for inpatient admission to the hospital.        Final diagnoses:   Acute hypoxic respiratory failure        ED Disposition       ED Disposition   Decision to Admit    Condition   --    Comment   Level of Care: Telemetry [5]  Diagnosis: Acute hypoxic respiratory failure [[id number]]  Certification: I Certify That Inpatient Hospital Services Are Medically Necessary For Greater Than 2 Midnights                 This medical record created using voice recognition software.             Peyman Lynne MD  03/03/25 0021

## 2025-03-03 NOTE — CONSULTS
[facility]   Consult Note    Patient Name: Rian Mckay  : 1940  MRN: [id number]  Primary Care Physician:  Abby Montaño DO  Referring Physician: No ref. provider found  Date of admission: 3/2/2025    Inpatient Pulmonology Consult  Consult performed by: Chago Keating DO  Consult ordered by: Carlos Earl DO        Subjective   Subjective     Reason for Consult/ Chief Complaint: Emphysema, acute hypoxic respiratory failure    History of Present Illness  Rian Mckay is a 84 y.o. male with past medical history of emphysema, hypertension, coronary artery disease presented to the ED for evaluation of worsening shortness of breath over the last couple days.  In the ED, ABG was 7.4 , lactate of 2.7 and white count elevated.  CT angiogram demonstrating no pulmonary embolism.  However it does identify emphysema and notes calcified pleural plaques in the left hemithorax.  The service was consulted to assist in management treatment of patient's acute issues.  Upon assessment, patient lying in bed on 2 L nasal cannula no apparent respiratory distress.  Findings and plan were discussed with patient.  Patient reporting being more short of breath with activity recently.  Patient denies any fever or chills, nausea or vomiting, chest pain or chest tightness.  Patient does report a productive cough.  Patient denies being on any known sick contacts    Review of Systems     Personal History     Past Medical History:   Diagnosis Date    COPD (chronic obstructive pulmonary disease)     Coronary artery disease     Heart attack     HTN (hypertension)     SOB (shortness of breath)        Past Surgical History:   Procedure Laterality Date    CARDIAC SURGERY      OTHER SURGICAL HISTORY      Artificial joints/limbs       Family History: family history includes Heart disease in his father and mother; Osteoporosis in his mother. Otherwise pertinent FHx was reviewed and not pertinent to current  issue.    Social History:  reports that he quit smoking about 2 years ago. His smoking use included cigarettes. He started smoking about 72 years ago. He has a 17.4 pack-year smoking history. He has been exposed to tobacco smoke. He has never used smokeless tobacco. He reports current alcohol use of about 2.0 standard drinks of alcohol per week. He reports that he does not use drugs.    Home Medications:   Multiple Vitamins-Minerals, amLODIPine, aspirin, atorvastatin, fenofibrate, lisinopril, and metoprolol succinate XL    Allergies:  No Known Allergies    Objective    Objective     Vitals:  Temp:  [97.3 °F (36.3 °C)-97.9 °F (36.6 °C)] 97.7 °F (36.5 °C)  Heart Rate:  [] 110  Resp:  [18-21] 20  BP: (130-173)/(70-95) 173/88  Flow (L/min) (Oxygen Therapy):  [2-4] 2    Physical Exam  Pleasant  Resting comfortably  No acute distress  Result Review    Result Review:  I have personally reviewed the results from the time of this admission to 3/3/2025 16:21 EST and agree with these findings:  [x]  Laboratory  [x]  Microbiology  [x]  Radiology  []  EKG/Telemetry   []  Cardiology/Vascular   []  Pathology  []  Old records  []  Other:    Most notable findings include: CT scan showing emphysema    Assessment & Plan   Assessment / Plan     Brief Patient Summary:  Rian Mckay is a 84 y.o. 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    I personally seen   examined this patient and the medical decision making and assessment and plan reflect my and I assume responsibility for the care of this patient  Electronically signed by Chago Keating DO, 03/03/25, 4:21 PM EST.

## 2025-03-03 NOTE — PLAN OF CARE
Goal Outcome Evaluation:  Plan of Care Reviewed With: patient        Progress: no change  Outcome Evaluation: Alert and oriented times 4, VSS, no complaints of pain, continue plan of care

## 2025-03-03 NOTE — PLAN OF CARE
Goal Outcome Evaluation:  Plan of Care Reviewed With: patient        Progress: no change  Outcome Evaluation: Alert and orientedx4,HR between 105-110 most of shift - md aware, CIWA protocol in place - scored 3 each assessment this shift, POC ongoing

## 2025-03-03 NOTE — H&P
Patient Care Team:  Abby Montaño DO as PCP - General (Family Medicine)    Chief complaint shortness of breath    Subjective     Patient is a 84 y.o. male presents with shortness of breath that occurred suddenly while he was washing the dishes.  Daughter notes that his sats dropped to the 80s.  He received DuoNeb en route to the hospital.  He does not have prior history of COPD or asthma.  He denies fevers, chills nor of any other systemic symptoms.    In the emergency department, workup revealed emphysema on CT angiography but no evidence of pneumonia.  He did require 4 L nasal cannula to maintain oxygen saturations above 90.  He also had a lactic acid of 3.2.  He received a DuoNeb    Review of Systems   Pertinent items are noted in HPI    History  Past Medical History:   Diagnosis Date    COPD (chronic obstructive pulmonary disease)     Coronary artery disease     Heart attack     HTN (hypertension)     SOB (shortness of breath)      Past Surgical History:   Procedure Laterality Date    CARDIAC SURGERY      OTHER SURGICAL HISTORY      Artificial joints/limbs     Family History   Problem Relation Age of Onset    Heart disease Mother     Osteoporosis Mother     Heart disease Father      Social History     Tobacco Use    Smoking status: Former     Current packs/day: 0.00     Average packs/day: 0.3 packs/day for 69.7 years (17.4 ttl pk-yrs)     Types: Cigarettes     Start date: 1953     Quit date: 2022     Years since quittin.5     Passive exposure: Past    Smokeless tobacco: Never    Tobacco comments:     Quit this past sep    Vaping Use    Vaping status: Never Used   Substance Use Topics    Alcohol use: Yes     Alcohol/week: 2.0 standard drinks of alcohol     Types: 2 Shots of liquor per week     Comment: 2 drinks per day    Drug use: Never     Medications Prior to Admission   Medication Sig Dispense Refill Last Dose/Taking    amLODIPine (NORVASC) 10 MG tablet TAKE ONE TABLET BY MOUTH ONCE DAILY 90  tablet 1     atorvastatin (LIPITOR) 20 MG tablet TAKE 1 TABLET BY MOUTH DAILY FOR 90 DAYS. 90 tablet 1     Calcium Polycarbophil (FIBERCON PO) Take  by mouth.       fenofibrate (TRICOR) 145 MG tablet TAKE 1 TABLET BY MOUTH DAILY. 90 tablet 1     lisinopril (PRINIVIL,ZESTRIL) 40 MG tablet TAKE ONE TABLET BY MOUTH ONCE DAILY FOR 90 DAYS. 90 tablet 1     metoprolol succinate XL (TOPROL-XL) 50 MG 24 hr tablet TAKE 1 TABLET BY MOUTH DAILY  DAYS. 90 tablet 1     Multiple Vitamins-Minerals (ICAPS AREDS 2 PO) Take  by mouth.        Allergies:  Patient has no known allergies.    Objective     Vital Signs  Temp:  [97.7 °F (36.5 °C)-97.9 °F (36.6 °C)] 97.9 °F (36.6 °C)  Heart Rate:  [] 112  Resp:  [18-21] 20  BP: (130-172)/(70-95) 168/95    Physical Exam:      General Appearance:  Alert, cooperative, in no acute distress   Head:  Normocephalic, without obvious abnormality, atraumatic   Eyes:  Lids and lashes normal, conjunctivae and sclerae normal, no icterus, no pallor, corneas clear, PERRLA   Ears:  Ears appear intact with no abnormalities noted   Throat:  No oral lesions, no thrush, oral mucosa moist   Neck:  No adenopathy, supple, trachea midline, no thyromegaly, no carotid bruit, no JVD   Back:  No kyphosis present, no scoliosis present, no skin lesions, erythema or scars, no tenderness to percussion or palpation, range of motion normal   Lungs:  Clear to auscultation, respirations regular, even and unlabored    Heart:  Regular rhythm and normal rate, normal S1 and S2, no murmur, no gallop, no rub, no click   Chest Wall:  No abnormalities observed   Abdomen:  Normal bowel sounds, no masses, no organomegaly, soft non-tender, non-distended, no guarding, no rebound tenderness   Rectal:  Deferred   Extremities:  Moves all extremities well, no edema, no cyanosis, no redness   Pulses:  Pulses palpable and equal bilaterally   Skin:  No bleeding, bruising or rash   Lymph nodes:  No palpable adenopathy   Neurologic:   Cranial nerves 2 - 12 grossly intact, sensation intact, DTR present and equal bilaterally           Results Review:    I reviewed the patient's new clinical results.  I reviewed the patient's new imaging results and agree with the interpretation.  I reviewed the patient's other test results and agree with the interpretation  I personally viewed and interpreted the patient's EKG/Telemetry data    Assessment & Plan       Acute hypoxic respiratory failure    Primary hypertension    Leukocytosis  Sepsis  COPD     Patient presents with acute hypoxic respiratory failure of uncertain etiology.  CTA was done that rules out pneumonia, PE.  Patient did have a lactic acidosis and met sepsis criteria and therefore be given IV antibiotics and IV fluids. HE also has wheezing    Admit to hospitalist service  Telemetry   supplemental oxygen   Trend lactic acid  Empiric antibiotics  IV fluid bolus  Nebulizers scheduled and as needed  IV solumedrol  Supportive care   full code        Aditya Dasilva MD  03/03/25  05:12 EST

## 2025-03-04 LAB
ANION GAP SERPL CALCULATED.3IONS-SCNC: 12.4 MMOL/L (ref 5–15)
AV MEAN PRESS GRAD SYS DOP V1V2: 16 MMHG
BASOPHILS # BLD AUTO: 0.04 10*3/MM3 (ref 0–0.2)
BASOPHILS NFR BLD AUTO: 0.2 % (ref 0–1.5)
BH CV ECHO MEAS - AO ROOT DIAM: 3.7 CM
BH CV ECHO MEAS - AO V2 VTI: 47.3 CM
BH CV ECHO MEAS - AVA(I,D): 2.19 CM2
BH CV ECHO MEAS - EDV(CUBED): 187.1 ML
BH CV ECHO MEAS - EDV(MOD-SP2): 64.1 ML
BH CV ECHO MEAS - EDV(MOD-SP4): 70.1 ML
BH CV ECHO MEAS - EF(MOD-SP2): 67.4 %
BH CV ECHO MEAS - EF(MOD-SP4): 71 %
BH CV ECHO MEAS - ESV(CUBED): 47.8 ML
BH CV ECHO MEAS - ESV(MOD-SP2): 20.9 ML
BH CV ECHO MEAS - ESV(MOD-SP4): 20.3 ML
BH CV ECHO MEAS - FS: 36.5 %
BH CV ECHO MEAS - IVS/LVPW: 0.98 CM
BH CV ECHO MEAS - IVSD: 1.22 CM
BH CV ECHO MEAS - LA DIMENSION: 3.6 CM
BH CV ECHO MEAS - LAT PEAK E' VEL: 5.7 CM/SEC
BH CV ECHO MEAS - LV MASS(C)D: 300.3 GRAMS
BH CV ECHO MEAS - LV MAX PG: 12.3 MMHG
BH CV ECHO MEAS - LV MEAN PG: 8 MMHG
BH CV ECHO MEAS - LV V1 MAX: 175 CM/SEC
BH CV ECHO MEAS - LV V1 VTI: 32.9 CM
BH CV ECHO MEAS - LVIDD: 5.7 CM
BH CV ECHO MEAS - LVIDS: 3.6 CM
BH CV ECHO MEAS - LVOT AREA: 3.1 CM2
BH CV ECHO MEAS - LVOT DIAM: 2 CM
BH CV ECHO MEAS - LVPWD: 1.24 CM
BH CV ECHO MEAS - MED PEAK E' VEL: 7.1 CM/SEC
BH CV ECHO MEAS - MV A MAX VEL: 173 CM/SEC
BH CV ECHO MEAS - MV DEC SLOPE: 1189 CM/SEC2
BH CV ECHO MEAS - MV DEC TIME: 0.11 SEC
BH CV ECHO MEAS - MV E MAX VEL: 134 CM/SEC
BH CV ECHO MEAS - MV E/A: 0.77
BH CV ECHO MEAS - MV MEAN PG: 5 MMHG
BH CV ECHO MEAS - MV V2 VTI: 21.8 CM
BH CV ECHO MEAS - MVA(VTI): 4.7 CM2
BH CV ECHO MEAS - RVDD: 2.7 CM
BH CV ECHO MEAS - SV(LVOT): 103.4 ML
BH CV ECHO MEAS - SV(MOD-SP2): 43.2 ML
BH CV ECHO MEAS - SV(MOD-SP4): 49.8 ML
BH CV ECHO MEASUREMENTS AVERAGE E/E' RATIO: 20.94
BUN SERPL-MCNC: 18 MG/DL (ref 8–23)
BUN/CREAT SERPL: 19.1 (ref 7–25)
CALCIUM SPEC-SCNC: 9.8 MG/DL (ref 8.6–10.5)
CHLORIDE SERPL-SCNC: 107 MMOL/L (ref 98–107)
CO2 SERPL-SCNC: 19.6 MMOL/L (ref 22–29)
CREAT SERPL-MCNC: 0.94 MG/DL (ref 0.76–1.27)
DEPRECATED RDW RBC AUTO: 54.4 FL (ref 37–54)
EGFRCR SERPLBLD CKD-EPI 2021: 79.4 ML/MIN/1.73
EOSINOPHIL # BLD AUTO: 0 10*3/MM3 (ref 0–0.4)
EOSINOPHIL NFR BLD AUTO: 0 % (ref 0.3–6.2)
ERYTHROCYTE [DISTWIDTH] IN BLOOD BY AUTOMATED COUNT: 15.1 % (ref 12.3–15.4)
GLUCOSE SERPL-MCNC: 199 MG/DL (ref 65–99)
HCT VFR BLD AUTO: 43.5 % (ref 37.5–51)
HGB BLD-MCNC: 14.2 G/DL (ref 13–17.7)
IMM GRANULOCYTES # BLD AUTO: 0.22 10*3/MM3 (ref 0–0.05)
IMM GRANULOCYTES NFR BLD AUTO: 1.2 % (ref 0–0.5)
LEFT ATRIUM VOLUME INDEX: 22.3 ML/M2
LV EF BIPLANE MOD: 69.7 %
LYMPHOCYTES # BLD AUTO: 1.24 10*3/MM3 (ref 0.7–3.1)
LYMPHOCYTES NFR BLD AUTO: 6.9 % (ref 19.6–45.3)
MAGNESIUM SERPL-MCNC: 2.3 MG/DL (ref 1.6–2.4)
MCH RBC QN AUTO: 31.9 PG (ref 26.6–33)
MCHC RBC AUTO-ENTMCNC: 32.6 G/DL (ref 31.5–35.7)
MCV RBC AUTO: 97.8 FL (ref 79–97)
MONOCYTES # BLD AUTO: 0.62 10*3/MM3 (ref 0.1–0.9)
MONOCYTES NFR BLD AUTO: 3.5 % (ref 5–12)
NEUTROPHILS NFR BLD AUTO: 15.82 10*3/MM3 (ref 1.7–7)
NEUTROPHILS NFR BLD AUTO: 88.2 % (ref 42.7–76)
NRBC BLD AUTO-RTO: 0 /100 WBC (ref 0–0.2)
PHOSPHATE SERPL-MCNC: 2 MG/DL (ref 2.5–4.5)
PLATELET # BLD AUTO: 247 10*3/MM3 (ref 140–450)
PMV BLD AUTO: 9.7 FL (ref 6–12)
POTASSIUM SERPL-SCNC: 4.2 MMOL/L (ref 3.5–5.2)
RBC # BLD AUTO: 4.45 10*6/MM3 (ref 4.14–5.8)
SODIUM SERPL-SCNC: 139 MMOL/L (ref 136–145)
WBC NRBC COR # BLD AUTO: 17.94 10*3/MM3 (ref 3.4–10.8)

## 2025-03-04 PROCEDURE — 99233 SBSQ HOSP IP/OBS HIGH 50: CPT | Performed by: INTERNAL MEDICINE

## 2025-03-04 PROCEDURE — 25010000002 THIAMINE PER 100 MG: Performed by: INTERNAL MEDICINE

## 2025-03-04 PROCEDURE — 83735 ASSAY OF MAGNESIUM: CPT | Performed by: INTERNAL MEDICINE

## 2025-03-04 PROCEDURE — 94664 DEMO&/EVAL PT USE INHALER: CPT

## 2025-03-04 PROCEDURE — 84100 ASSAY OF PHOSPHORUS: CPT | Performed by: INTERNAL MEDICINE

## 2025-03-04 PROCEDURE — 25010000002 HEPARIN (PORCINE) PER 1000 UNITS: Performed by: STUDENT IN AN ORGANIZED HEALTH CARE EDUCATION/TRAINING PROGRAM

## 2025-03-04 PROCEDURE — 85025 COMPLETE CBC W/AUTO DIFF WBC: CPT | Performed by: STUDENT IN AN ORGANIZED HEALTH CARE EDUCATION/TRAINING PROGRAM

## 2025-03-04 PROCEDURE — 63710000001 REVEFENACIN 175 MCG/3ML SOLUTION

## 2025-03-04 PROCEDURE — 94799 UNLISTED PULMONARY SVC/PX: CPT

## 2025-03-04 PROCEDURE — 94761 N-INVAS EAR/PLS OXIMETRY MLT: CPT

## 2025-03-04 PROCEDURE — 99232 SBSQ HOSP IP/OBS MODERATE 35: CPT | Performed by: INTERNAL MEDICINE

## 2025-03-04 PROCEDURE — 94618 PULMONARY STRESS TESTING: CPT

## 2025-03-04 PROCEDURE — 63710000001 PREDNISONE PER 1 MG: Performed by: INTERNAL MEDICINE

## 2025-03-04 PROCEDURE — 80048 BASIC METABOLIC PNL TOTAL CA: CPT | Performed by: STUDENT IN AN ORGANIZED HEALTH CARE EDUCATION/TRAINING PROGRAM

## 2025-03-04 RX ORDER — AMLODIPINE BESYLATE 10 MG/1
10 TABLET ORAL DAILY
Status: DISCONTINUED | OUTPATIENT
Start: 2025-03-04 | End: 2025-03-05 | Stop reason: HOSPADM

## 2025-03-04 RX ORDER — PREDNISONE 20 MG/1
40 TABLET ORAL
Status: DISCONTINUED | OUTPATIENT
Start: 2025-03-04 | End: 2025-03-05 | Stop reason: HOSPADM

## 2025-03-04 RX ORDER — ATORVASTATIN CALCIUM 20 MG/1
20 TABLET, FILM COATED ORAL NIGHTLY
Status: DISCONTINUED | OUTPATIENT
Start: 2025-03-04 | End: 2025-03-05 | Stop reason: HOSPADM

## 2025-03-04 RX ORDER — ASPIRIN 81 MG/1
81 TABLET ORAL DAILY
Status: DISCONTINUED | OUTPATIENT
Start: 2025-03-04 | End: 2025-03-05 | Stop reason: HOSPADM

## 2025-03-04 RX ADMIN — REVEFENACIN 175 MCG: 175 SOLUTION RESPIRATORY (INHALATION) at 09:51

## 2025-03-04 RX ADMIN — HEPARIN SODIUM 5000 UNITS: 5000 INJECTION INTRAVENOUS; SUBCUTANEOUS at 17:27

## 2025-03-04 RX ADMIN — FOLIC ACID 1 MG: 1 TABLET ORAL at 08:16

## 2025-03-04 RX ADMIN — METOPROLOL SUCCINATE 50 MG: 50 TABLET, EXTENDED RELEASE ORAL at 08:16

## 2025-03-04 RX ADMIN — BUDESONIDE 0.5 MG: 0.5 INHALANT RESPIRATORY (INHALATION) at 20:39

## 2025-03-04 RX ADMIN — ATORVASTATIN CALCIUM 20 MG: 20 TABLET, FILM COATED ORAL at 20:09

## 2025-03-04 RX ADMIN — BUDESONIDE 0.5 MG: 0.5 INHALANT RESPIRATORY (INHALATION) at 09:51

## 2025-03-04 RX ADMIN — THIAMINE HYDROCHLORIDE 500 MG: 100 INJECTION, SOLUTION INTRAMUSCULAR; INTRAVENOUS at 20:09

## 2025-03-04 RX ADMIN — THIAMINE HYDROCHLORIDE 500 MG: 100 INJECTION, SOLUTION INTRAMUSCULAR; INTRAVENOUS at 05:35

## 2025-03-04 RX ADMIN — Medication 1 TABLET: at 08:16

## 2025-03-04 RX ADMIN — HEPARIN SODIUM 5000 UNITS: 5000 INJECTION INTRAVENOUS; SUBCUTANEOUS at 05:34

## 2025-03-04 RX ADMIN — ASPIRIN 81 MG: 81 TABLET, COATED ORAL at 08:16

## 2025-03-04 RX ADMIN — ARFORMOTEROL TARTRATE 15 MCG: 15 SOLUTION RESPIRATORY (INHALATION) at 20:39

## 2025-03-04 RX ADMIN — AMLODIPINE BESYLATE 10 MG: 10 TABLET ORAL at 08:16

## 2025-03-04 RX ADMIN — SENNOSIDES AND DOCUSATE SODIUM 2 TABLET: 50; 8.6 TABLET ORAL at 14:08

## 2025-03-04 RX ADMIN — PREDNISONE 40 MG: 20 TABLET ORAL at 08:16

## 2025-03-04 RX ADMIN — ARFORMOTEROL TARTRATE 15 MCG: 15 SOLUTION RESPIRATORY (INHALATION) at 09:51

## 2025-03-04 RX ADMIN — Medication 10 ML: at 20:09

## 2025-03-04 RX ADMIN — THIAMINE HYDROCHLORIDE 500 MG: 100 INJECTION, SOLUTION INTRAMUSCULAR; INTRAVENOUS at 14:01

## 2025-03-04 NOTE — PROGRESS NOTES
[facility]   Hospitalist Progress Note  Date: 3/4/2025  Patient Name: Rian Mckay  : 1940  MRN: [id number]  Date of admission: 3/2/2025      Subjective   Subjective     Chief Complaint: Follow up for shortness of breath    Summary: 84-year-old male with emphysema, hypertension, coronary artery disease with remote history of stenting who presented with shortness of breath.  SpO2 at home reported to be in the 80s.  On presentation SpO2 was 87% on room air.  WBC 12.2, lactate 2.7, CO2 20.8, anion gap 16.  ABG pH 7.4, pCO2 32.5, pO2 56 on 4 L nasal cannula.  CT angiogram chest no PE, changes of emphysema and reported pleural calcified plaques in the left hemithorax.    Interval Followup:   Weaned to room air at rest  Reports less dyspnea.  Denies chest pain, cough, palpitation  No fever chills  Denying headache, nausea, diaphoresis, vision changes, nausea or vomiting    Objective   Objective     Vitals:   Temp:  [97.3 °F (36.3 °C)-98.4 °F (36.9 °C)] 97.5 °F (36.4 °C)  Heart Rate:  [] 92  Resp:  [18-22] 18  BP: (140-173)/(79-90) 163/80  Flow (L/min) (Oxygen Therapy):  [1-3] 1  Physical Exam    Constitutional: conversant, NAD   Respiratory:  nonlabored respirations    Cardiovascular:  RRR, no edema   Gastrointestinal: soft, nondistended   Neurologic: Alert, speech clear   Skin: Extremities warm    Result Review    Result Review:  I have personally reviewed the following over the last 24 hours (07:00 to 07:00) and agree with the following findings  [x]  Laboratory  CBC          3/2/2025    21:25 3/3/2025    03:14 3/3/2025    06:24 3/4/2025    05:13   CBC   WBC 12.21  18.87  21.43  17.94    RBC 4.94  4.83  4.75  4.45    Hemoglobin 15.9  15.3  15.6  14.2    Hematocrit 47.9  45.8  46.1  43.5    MCV 97.0  94.8  97.1  97.8    MCH 32.2  31.7  32.8  31.9    MCHC 33.2  33.4  33.8  32.6    RDW 14.5  14.6  14.5  15.1    Platelets 270  260  261  247      CMP          3/2/2025    21:25 3/3/2025    03:14 3/3/2025     06:24 3/4/2025    05:13   CMP   Glucose 170  190  221  199    BUN 14  17  17  18    Creatinine 1.11  1.13  1.00  0.94    EGFR 65.1  63.7  73.8  79.4    Sodium 139  137  135  139    Potassium 3.5  4.3  3.9  4.2    Chloride 102  104  102  107    Calcium 10.2  10.7  9.6  9.8    Total Protein 7.5       Albumin 4.2       Globulin 3.3       Total Bilirubin 0.2       Alkaline Phosphatase 45       AST (SGOT) 19       ALT (SGPT) 15       Albumin/Globulin Ratio 1.3       BUN/Creatinine Ratio 12.6  15.0  17.0  19.1    Anion Gap 16.2  18.2  15.6  12.4      [x]  Microbiology  [x]  Radiology   CT Angiogram Chest Pulmonary Embolism    Result Date: 3/2/2025  CT ANGIOGRAM CHEST PULMONARY EMBOLISM Date of Exam: 3/2/2025 10:50 PM EST Indication: sob. Comparison: None available. Technique: Axial CT images were obtained of the chest after the uneventful intravenous administration of iodinated contrast utilizing pulmonary embolism protocol.  Reconstructed coronal and sagittal images were also obtained. Automated exposure control and iterative construction methods were used. Findings: No pulmonary embolism or aortic dissection is identified. There is atherosclerotic disease and coronary artery disease. There is dilatation of the mid ascending thoracic aorta at 3.5 cm. There is dilatation of the distal thoracic aorta at the level of the diaphragmatic hiatus at 3.3 cm. Visualized abdominal aorta through the renal arteries is normal in caliber. Ascending aorta is normal in caliber at 3.7 cm. No pleural or pericardial effusion is seen. There is no adenopathy by size criteria. Small hiatal hernia is present. No acute findings in the visualized upper abdomen. Lung windows demonstrate emphysema. Calcified pleural plaques in the left hemithorax could reflect prior asbestos exposure. There are areas of bandlike scarring/atelectasis in both lungs. Granulomatous calcifications are present in both lungs. No definite evidence of acute pneumonia. No  evidence of pulmonary edema. No pneumothorax.     1.No pulmonary embolism or aortic dissection identified. 2.Emphysema. No evidence of acute pneumonia or pulmonary edema. 3.Calcified pleural plaques in the left hemithorax could reflect prior asbestos exposure. 4.Atherosclerotic disease and coronary artery disease. 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 Consider follow-up examination in 1 year. Electronically Signed: Sridhar Lopez MD  3/2/2025 11:30 PM EST  Workstation ID: [id number]    XR Chest 1 View    Result Date: 3/2/2025  AP PORTABLE CHEST  HISTORY: Shortness of air.  COMPARISON: 1/24/2021  TECHNIQUE: AP portable chest x-ray.  FINDINGS: Cardiac and mediastinal contours are normal. There is atherosclerotic disease in the aorta. Pulmonary emphysema is present. Interstitial changes in the lower lung zones have the appearance of scarring/atelectasis rather than pneumonia. Correlate clinically in this regard. The mid to upper lung zones are clear. Pulmonary vascularity is normal. There is no pneumothorax.      Emphysema with probable scarring or atelectasis in the lower lung zones. Pneumonia should be excluded clinically.  3/2/2025 9:19 PM by Dr. Sridhar Lopez MD on Workstation: [id number]       [x]  EKG/Telemetry monitor personally reviewed and independently interpreted: Sinus tachycardia with PVCs  []  Cardiology/Vascular   []  Pathology  []  Old records  []  Other:    Assessment & Plan   Assessment / Plan     Assessment/Plan:  Hypoxemia  Emphysema on CT  Pleural plaques  Lactic acidosis, clinically significant  Anion gap metabolic acidosis  CAD with prior PCI  Essential hypertension  Chronic alcohol use       Pulmonology following recommendations reviewed  Now on Brovana, Pulmicort, Yupelri  DuoNeb switched to every 4 hours as needed  Switch to prednisone 40 mg daily  RT CM consulted for bedside spirometry  Pro-Jonathan 0.04,  strep and Legionella urinary antigen negative.  Blood cultures NGTD.   White count likely related to steroids, stop cefepime  Wean oxygen, SpO2 goal of 90%  Lactate downtrending with IV fluids.  Appears euvolemic.  Hold further fluids  Chest pain-free.  Troponin negative x 2.  proBNP normal.  2D echo report pending.  Continue baby aspirin and statin  SBP above 160.  Restart amlodipine 10 mg daily.  Continue home Toprol-XL 50 mg daily  Reports longstanding daily alcohol use, ~1 pint daily for last 40 years.  Denies history of withdrawals.  Last drink was 3/2 does not appear acutely intoxicated.  Ethanol level negative. Continue high-dose IV thiamine, folic acid and MVI.  As needed Ativan per [facility] protocol  VTE prophylaxis: Subcu heparin 5000 units every 12 hours  CBC, BMP, magnesium and phosphorus level in AM    Updated daughter at bedside  Discussed plan with Dr. Keating and RN    VTE Prophylaxis:  Pharmacologic VTE prophylaxis orders are present.      CODE STATUS:   Code Status (Patient has no pulse and is not breathing): CPR (Attempt to Resuscitate)  Medical Interventions (Patient has pulse or is breathing): Full Support    Electronically signed by Carlos Earl DO, 03/04/25, 3:14 PM EST.

## 2025-03-04 NOTE — PROGRESS NOTES
Walking Oximetry Progress Note      Patient Name:  Rian Mckay  YOB: 1940  Date of Procedure: 03/04/25              ROOM AIR BASELINE   SpO2% 87   Heart Rate 91     EXERCISE ON ROOM AIR SpO2% EXERCISE ON O2 LPM SpO2%   1 MINUTE  1 MINUTE Pd 2 88   2 MINUTES  2 MINUTES Pd 2 87   3 MINUTES  3 MINUTES Pd 3 90   4 MINUTES  4 MINUTES Pd 3 91   5 MINUTES  5 MINUTES Pd 3 91   6 MINUTES  6 MINUTES Pd 3 91              Time to Recovery     SpO2% Post Exercise  94 on 1.5 lpm.    HR Post Exercise  104     Comments:           Electronically signed by Katerina Stovall, VISHAL, 03/04/25, 1:06 PM EST.

## 2025-03-04 NOTE — PLAN OF CARE
Goal Outcome Evaluation:  Plan of Care Reviewed With: patient        Progress: no change  Outcome Evaluation: VSS. Patient AAOx4. CIWA protocol q4h, scored 2 for one assessment. Walking oximetry failed during this shift. Family has been at bedside throughout shift. No other concerns or complaints. Continue plan of care.

## 2025-03-04 NOTE — PLAN OF CARE
Goal Outcome Evaluation:  Plan of Care Reviewed With: patient is currently on 1l nc with sat's greater than 90% through out the night.  Patient also voiced being up set being put on meds because of his drinking a 5th daily.  Explained it was a precaution and it was just a vitamin.  VSS.  Cont. POC.

## 2025-03-04 NOTE — PROGRESS NOTES
[facility]     Progress Note    Patient Name: Rian Mckay  : 1940  MRN: [id number]  Primary Care Physician:  Abby Montaño DO  Date of admission: 3/2/2025    Subjective   Subjective     Chief Complaint: Follow-up shortness of breath    History of Present Illness  Patient Reports still reports shortness of breath  On 1 L    Review of Systems    Objective   Objective     Vitals:   Temp:  [97.5 °F (36.4 °C)-98.4 °F (36.9 °C)] 97.5 °F (36.4 °C)  Heart Rate:  [] 89  Resp:  [18-22] 20  BP: (146-166)/(79-95) 166/95  Flow (L/min) (Oxygen Therapy):  [1] 1    Physical Exam   Poor air exchange resting comfortably in no acute distress  Patient very jittery  Result Review    Result Review:  I have personally reviewed the results from the time of this admission to 3/4/2025 16:52 EST and agree with these findings:  []  Laboratory list / accordion  []  Microbiology  []  Radiology  []  EKG/Telemetry   []  Cardiology/Vascular   []  Pathology  []  Old records  []  Other:  Most notable findings include: CT scan showing emphysema      Assessment & Plan   Assessment / Plan     Brief Patient Summary:  Rian Mckay is a 84 y.o. male who admitted to the hospital shortness of breath    Active Hospital Problems:  Active Hospital Problems    Diagnosis     **Acute hypoxic respiratory failure     Leukocytosis     Primary hypertension      Plan:   Echo pending  Continue LABA LAMA inhaled corticosteroid  Continue systemic steroids  Concern that the patient could develop DTs given his alcohol abuse  Will obtain bedside spirometry to determine the severity of his obstructive defect    VTE Prophylaxis:  Pharmacologic VTE prophylaxis orders are present.        CODE STATUS:    Code Status (Patient has no pulse and is not breathing): CPR (Attempt to Resuscitate)  Medical Interventions (Patient has pulse or is breathing): Full Support      Chago Keating DO    Electronically signed by Chago Keating DO,  03/04/25, 4:58 PM EST.

## 2025-03-05 ENCOUNTER — READMISSION MANAGEMENT (OUTPATIENT)
Dept: CALL CENTER | Facility: HOSPITAL | Age: 85
End: 2025-03-05
Payer: MEDICARE

## 2025-03-05 VITALS
WEIGHT: 217.81 LBS | DIASTOLIC BLOOD PRESSURE: 95 MMHG | SYSTOLIC BLOOD PRESSURE: 156 MMHG | HEART RATE: 76 BPM | RESPIRATION RATE: 20 BRPM | HEIGHT: 74 IN | BODY MASS INDEX: 27.95 KG/M2 | OXYGEN SATURATION: 95 % | TEMPERATURE: 98.1 F

## 2025-03-05 LAB
ANION GAP SERPL CALCULATED.3IONS-SCNC: 11.2 MMOL/L (ref 5–15)
BUN SERPL-MCNC: 19 MG/DL (ref 8–23)
BUN/CREAT SERPL: 23.2 (ref 7–25)
CALCIUM SPEC-SCNC: 9.2 MG/DL (ref 8.6–10.5)
CHLORIDE SERPL-SCNC: 104 MMOL/L (ref 98–107)
CO2 SERPL-SCNC: 22.8 MMOL/L (ref 22–29)
CREAT SERPL-MCNC: 0.82 MG/DL (ref 0.76–1.27)
DEPRECATED RDW RBC AUTO: 54.3 FL (ref 37–54)
EGFRCR SERPLBLD CKD-EPI 2021: 86.1 ML/MIN/1.73
ERYTHROCYTE [DISTWIDTH] IN BLOOD BY AUTOMATED COUNT: 15 % (ref 12.3–15.4)
GLUCOSE SERPL-MCNC: 124 MG/DL (ref 65–99)
HCT VFR BLD AUTO: 43.4 % (ref 37.5–51)
HGB BLD-MCNC: 14.4 G/DL (ref 13–17.7)
MCH RBC QN AUTO: 32.1 PG (ref 26.6–33)
MCHC RBC AUTO-ENTMCNC: 33.2 G/DL (ref 31.5–35.7)
MCV RBC AUTO: 96.9 FL (ref 79–97)
PLATELET # BLD AUTO: 253 10*3/MM3 (ref 140–450)
PMV BLD AUTO: 10.3 FL (ref 6–12)
POTASSIUM SERPL-SCNC: 3.5 MMOL/L (ref 3.5–5.2)
RBC # BLD AUTO: 4.48 10*6/MM3 (ref 4.14–5.8)
SODIUM SERPL-SCNC: 138 MMOL/L (ref 136–145)
WBC NRBC COR # BLD AUTO: 17.55 10*3/MM3 (ref 3.4–10.8)

## 2025-03-05 PROCEDURE — 94761 N-INVAS EAR/PLS OXIMETRY MLT: CPT

## 2025-03-05 PROCEDURE — 85027 COMPLETE CBC AUTOMATED: CPT | Performed by: INTERNAL MEDICINE

## 2025-03-05 PROCEDURE — 94799 UNLISTED PULMONARY SVC/PX: CPT

## 2025-03-05 PROCEDURE — 63710000001 REVEFENACIN 175 MCG/3ML SOLUTION

## 2025-03-05 PROCEDURE — 25010000002 THIAMINE PER 100 MG: Performed by: INTERNAL MEDICINE

## 2025-03-05 PROCEDURE — 80048 BASIC METABOLIC PNL TOTAL CA: CPT | Performed by: INTERNAL MEDICINE

## 2025-03-05 PROCEDURE — 63710000001 PREDNISONE PER 1 MG: Performed by: INTERNAL MEDICINE

## 2025-03-05 PROCEDURE — 99239 HOSP IP/OBS DSCHRG MGMT >30: CPT | Performed by: INTERNAL MEDICINE

## 2025-03-05 RX ORDER — LANOLIN ALCOHOL/MO/W.PET/CERES
100 CREAM (GRAM) TOPICAL DAILY
Qty: 30 TABLET | Refills: 0 | Status: SHIPPED | OUTPATIENT
Start: 2025-03-10 | End: 2025-04-09

## 2025-03-05 RX ORDER — IPRATROPIUM BROMIDE AND ALBUTEROL SULFATE 2.5; .5 MG/3ML; MG/3ML
3 SOLUTION RESPIRATORY (INHALATION) EVERY 4 HOURS PRN
Qty: 360 ML | Refills: 0 | Status: SHIPPED | OUTPATIENT
Start: 2025-03-05

## 2025-03-05 RX ORDER — PREDNISONE 20 MG/1
40 TABLET ORAL
Qty: 6 TABLET | Refills: 0 | Status: SHIPPED | OUTPATIENT
Start: 2025-03-06 | End: 2025-03-09

## 2025-03-05 RX ORDER — ALBUTEROL SULFATE 90 UG/1
2 INHALANT RESPIRATORY (INHALATION) EVERY 4 HOURS PRN
Qty: 8.5 G | Refills: 0 | Status: SHIPPED | OUTPATIENT
Start: 2025-03-05

## 2025-03-05 RX ORDER — BUDESONIDE AND FORMOTEROL FUMARATE DIHYDRATE 160; 4.5 UG/1; UG/1
2 AEROSOL RESPIRATORY (INHALATION)
Qty: 10.2 G | Refills: 0 | Status: SHIPPED | OUTPATIENT
Start: 2025-03-05

## 2025-03-05 RX ORDER — PANTOPRAZOLE SODIUM 40 MG/1
40 TABLET, DELAYED RELEASE ORAL DAILY
Qty: 30 TABLET | Refills: 0 | Status: SHIPPED | OUTPATIENT
Start: 2025-03-05

## 2025-03-05 RX ADMIN — METOPROLOL SUCCINATE 50 MG: 50 TABLET, EXTENDED RELEASE ORAL at 08:51

## 2025-03-05 RX ADMIN — ARFORMOTEROL TARTRATE 15 MCG: 15 SOLUTION RESPIRATORY (INHALATION) at 07:40

## 2025-03-05 RX ADMIN — Medication 1 TABLET: at 08:51

## 2025-03-05 RX ADMIN — ASPIRIN 81 MG: 81 TABLET, COATED ORAL at 08:51

## 2025-03-05 RX ADMIN — THIAMINE HYDROCHLORIDE 500 MG: 100 INJECTION, SOLUTION INTRAMUSCULAR; INTRAVENOUS at 05:43

## 2025-03-05 RX ADMIN — REVEFENACIN 175 MCG: 175 SOLUTION RESPIRATORY (INHALATION) at 07:40

## 2025-03-05 RX ADMIN — FOLIC ACID 1 MG: 1 TABLET ORAL at 08:51

## 2025-03-05 RX ADMIN — BUDESONIDE 0.5 MG: 0.5 INHALANT RESPIRATORY (INHALATION) at 07:40

## 2025-03-05 RX ADMIN — PREDNISONE 40 MG: 20 TABLET ORAL at 08:50

## 2025-03-05 RX ADMIN — AMLODIPINE BESYLATE 10 MG: 10 TABLET ORAL at 08:51

## 2025-03-05 NOTE — PLAN OF CARE
"Goal Outcome Evaluation:              Outcome Evaluation: Pt. alert and oriented x4, VSS. CIWA protocol discontinued. Failed 02 walking test. Patient upset that he has to continue with IV Thiamine and is in denial he has a \"drinking problem\". Patient denies any pain or needs at this time. Will continue to monitor plan of care.                             "

## 2025-03-05 NOTE — DISCHARGE SUMMARY
[facility]         HOSPITALIST  DISCHARGE SUMMARY    Patient Name: Rian Mckay  : 1940  MRN: [id number]    Date of Admission: 3/2/2025  Date of Discharge:  25  Primary Care Physician: Abby Montaño DO    Consults       Date and Time Order Name Status Description    3/3/2025  9:58 AM Inpatient Pulmonology Consult Completed     3/2/2025 10:05 PM Inpatient Hospitalist Consult              Active and Resolved Hospital Problems:  Hypoxemia  Emphysema on CT  Suspected COPD exacerbation  Pleural plaques  Lactic acidosis, clinically significant  Anion gap metabolic acidosis  CAD with prior PCI  Essential hypertension  Chronic alcohol use    Hospital Course     Hospital Course:  Rian Mckay is a 84 y.o. male  with emphysema, hypertension, coronary artery disease with remote history of stenting who presented with shortness of breath.  SpO2 at home reported to be in the 80s.  On presentation SpO2 was 87% on room air.  WBC 12.2, lactate 2.7, CO2 20.8, anion gap 16.  ABG pH 7.4, pCO2 32.5, pO2 56 on 4 L nasal cannula.  CT angiogram chest no PE, changes of emphysema and reported pleural calcified plaques in the left hemithorax.  Seen by pulmonology.  Responded to bronchodilators, steroids.  Infectious workup negative.Chest pain-free. Troponin negative x 2. proBNP normal.  2D echo preserved EF, no significant diastolic dysfunction or valvular disease.  Monitor for alcohol withdrawal but did not show any signs.  She requirements decreased, underwent 6-minute walk test with SpO2 87% on room air, requiring 3 L nasal cannula with exertion to make SpO2 90% and above.  Home oxygen was arranged along with nebulizer, Symbicort and Spiriva, as needed DuoNehung.  Will have close follow-up with pulmonology.  Discharged home in stable condition    Day of Discharge     Vital Signs:  Temp:  [97.4 °F (36.3 °C)-98.1 °F (36.7 °C)] 98.1 °F (36.7 °C)  Heart Rate:  [74-97] 76  Resp:  [18-20] 20  BP:  (151-167)/(83-95) 156/95  Flow (L/min) (Oxygen Therapy):  [2-4] 4  Physical Exam:   GENERAL: conversant and nontoxic.  HEART: RRR. No edema  LUNGS: nonlabored  ABDOMEN: Soft, nondistended  NEUROLOGIC: Alert, CN intact      Discharge Details        Discharge Medications        New Medications        Instructions Start Date   albuterol sulfate  (90 Base) MCG/ACT inhaler  Commonly known as: PROVENTIL HFA;VENTOLIN HFA;PROAIR HFA   2 puffs, Inhalation, Every 4 Hours PRN      budesonide-formoterol 160-4.5 MCG/ACT inhaler  Commonly known as: Symbicort   2 puffs, Inhalation, 2 Times Daily - RT      ipratropium-albuterol 0.5-2.5 mg/3 ml nebulizer  Commonly known as: DUO-NEB   3 mL, Nebulization, Every 4 Hours PRN      pantoprazole 40 MG EC tablet  Commonly known as: Protonix   40 mg, Oral, Daily      predniSONE 20 MG tablet  Commonly known as: DELTASONE   40 mg, Oral, Daily With Breakfast   Start Date: March 6, 2025     thiamine 100 MG tablet  Commonly known as: VITAMIN B1   100 mg, Oral, Daily   Start Date: March 10, 2025     tiotropium bromide monohydrate 2.5 MCG/ACT aerosol solution inhaler  Commonly known as: SPIRIVA RESPIMAT   2 puffs, Inhalation, Daily - RT             Changes to Medications        Instructions Start Date   lisinopril 40 MG tablet  Commonly known as: PRINIVIL,ZESTRIL  What changed: See the new instructions.   TAKE ONE TABLET BY MOUTH ONCE DAILY FOR 90 DAYS.             Continue These Medications        Instructions Start Date   amLODIPine 10 MG tablet  Commonly known as: NORVASC   TAKE ONE TABLET BY MOUTH ONCE DAILY      aspirin 81 MG EC tablet   81 mg, Daily      atorvastatin 20 MG tablet  Commonly known as: LIPITOR   TAKE 1 TABLET BY MOUTH DAILY FOR 90 DAYS.      fenofibrate 145 MG tablet  Commonly known as: TRICOR   145 mg, Oral, Daily      ICAPS AREDS 2 PO   1 capsule, 2 Times Daily      metoprolol succinate XL 50 MG 24 hr tablet  Commonly known as: TOPROL-XL   50 mg, Oral, Daily                No Known Allergies    Discharge Disposition:  Home-Health Care [facility]    Diet:  Hospital:  Diet Order   Procedures    Diet: Cardiac; Healthy Heart (2-3 Na+); Fluid Consistency: Thin (IDDSI 0)       Discharge Activity:   Activity Instructions       Gradually Increase Activity Until at Pre-Hospitalization Level              CODE STATUS:  Code Status and Medical Interventions: CPR (Attempt to Resuscitate); Full Support   Ordered at: 03/02/25 2235     Code Status (Patient has no pulse and is not breathing):    CPR (Attempt to Resuscitate)     Medical Interventions (Patient has pulse or is breathing):    Full Support         Future Appointments   Date Time Provider Department Center   7/8/2025  1:00 PM Abby Montaño DO [facility]       Additional Instructions for the Follow-ups that You Need to Schedule       Discharge Follow-up with Specified Provider: Dr Keating; 1 Week   As directed      To: Dr Keating   Follow Up: 1 Week                Pertinent  and/or Most Recent Results     PROCEDURES:   NONE    LAB RESULTS:      Lab 03/05/25  0452 03/04/25  0513 03/03/25  1807 03/03/25  1221 03/03/25  0624 03/03/25  0314 03/03/25  0055 03/02/25  2210 03/02/25  2125   WBC 17.55* 17.94*  --   --  21.43* 18.87*  --   --  12.21*   HEMOGLOBIN 14.4 14.2  --   --  15.6 15.3  --   --  15.9   HEMATOCRIT 43.4 43.5  --   --  46.1 45.8  --   --  47.9   PLATELETS 253 247  --   --  261 260  --   --  270   NEUTROS ABS  --  15.82*  --   --  19.68* 17.43*  --   --  6.86   IMMATURE GRANS (ABS)  --  0.22*  --   --  0.10* 0.11*  --   --  0.08*   LYMPHS ABS  --  1.24  --   --  1.10 0.89  --   --  4.21*   MONOS ABS  --  0.62  --   --  0.50 0.38  --   --  0.67   EOS ABS  --  0.00  --   --  0.00 0.00  --   --  0.27   MCV 96.9 97.8*  --   --  97.1* 94.8  --   --  97.0   PROCALCITONIN  --   --   --   --  0.04  --   --   --   --    LACTATE  --   --  3.2* 3.9* 3.9* 2.8* 3.2*   < >  --     < > = values in this interval not displayed.         Lab  03/05/25  0452 03/04/25  0513 03/03/25 0624 03/03/25 0314 03/02/25 2125   SODIUM 138 139 135* 137 139   POTASSIUM 3.5 4.2 3.9 4.3 3.5   CHLORIDE 104 107 102 104 102   CO2 22.8 19.6* 17.4* 14.8* 20.8*   ANION GAP 11.2 12.4 15.6* 18.2* 16.2*   BUN 19 18 17 17 14   CREATININE 0.82 0.94 1.00 1.13 1.11   EGFR 86.1 79.4 73.8 63.7 65.1   GLUCOSE 124* 199* 221* 190* 170*   CALCIUM 9.2 9.8 9.6 10.7* 10.2   MAGNESIUM  --  2.3 2.0  --   --    PHOSPHORUS  --  2.0*  --   --   --          Lab 03/02/25 2125   TOTAL PROTEIN 7.5   ALBUMIN 4.2   GLOBULIN 3.3   ALT (SGPT) 15   AST (SGOT) 19   BILIRUBIN 0.2   ALK PHOS 45         Lab 03/02/25  2302 03/02/25 2125   PROBNP  --  277.2   HSTROP T 11 14                 Lab 03/02/25  2210   PH, ARTERIAL 7.428   PCO2, ARTERIAL 32.5*   PO2 ART 56.3*   O2 SATURATION ART 90.0*   HCO3 ART 21.4*   BASE EXCESS ART -1.9     Brief Urine Lab Results  (Last result in the past 365 days)        Color   Clarity   Blood   Leuk Est   Nitrite   Protein   CREAT   Urine HCG        03/03/25 0820 Yellow   Clear   Negative   Trace   Negative   Negative                 Microbiology Results (last 10 days)       Procedure Component Value - Date/Time    S. Pneumo Ag Urine or CSF - Urine, Urine, Clean Catch [[id number]]  (Normal) Collected: 03/03/25 0820    Lab Status: Final result Specimen: Urine, Clean Catch Updated: 03/03/25 1048     Strep Pneumo Ag Negative    Legionella Antigen, Urine - Urine, Urine, Clean Catch [[id number]]  (Normal) Collected: 03/03/25 0820    Lab Status: Final result Specimen: Urine, Clean Catch Updated: 03/03/25 1048     LEGIONELLA ANTIGEN, URINE Negative    Blood Culture - Blood, Hand, Left [[id number]]  (Normal) Collected: 03/03/25 0624    Lab Status: Preliminary result Specimen: Blood from Hand, Left Updated: 03/05/25 0630     Blood Culture No growth at 2 days    Blood Culture - Blood, Hand, Right [[id number]]  (Normal) Collected: 03/03/25 0624    Lab Status: Preliminary result Specimen:  Blood from Hand, Right Updated: 03/05/25 0630     Blood Culture No growth at 2 days    COVID PRE-OP / PRE-PROCEDURE SCREENING ORDER (NO ISOLATION) - Swab, Nasopharynx [[id number]]  (Normal) Collected: 03/02/25 2125    Lab Status: Final result Specimen: Swab from Nasopharynx Updated: 03/02/25 2220    Narrative:      The following orders were created for panel order COVID PRE-OP / PRE-PROCEDURE SCREENING ORDER (NO ISOLATION) - Swab, Nasopharynx.  Procedure                               Abnormality         Status                     ---------                               -----------         ------                     COVID-19, FLU A/B, RSV P...[[id number]]  Normal              Final result                 Please view results for these tests on the individual orders.    COVID-19, FLU A/B, RSV PCR 1 HR TAT - Swab, Nasopharynx [[id number]]  (Normal) Collected: 03/02/25 2125    Lab Status: Final result Specimen: Swab from Nasopharynx Updated: 03/02/25 2220     COVID19 Not Detected     Influenza A PCR Not Detected     Influenza B PCR Not Detected     RSV, PCR Not Detected    Narrative:      Fact sheet for providers: https://www.fda.gov/media/650111/download    Fact sheet for patients: https://www.fda.gov/media/749613/download    Test performed by PCR.            CT Angiogram Chest Pulmonary Embolism    Result Date: 3/2/2025  1.No pulmonary embolism or aortic dissection identified. 2.Emphysema. No evidence of acute pneumonia or pulmonary edema. 3.Calcified pleural plaques in the left hemithorax could reflect prior asbestos exposure. 4.Atherosclerotic disease and coronary artery disease. 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 Consider follow-up examination in 1 year. Electronically Signed: Sridhar Lopez MD  3/2/2025 11:30 PM EST  Workstation ID: [id number]    XR Chest 1 View    Result Date: 3/2/2025  Emphysema with probable scarring or atelectasis in the lower lung zones. Pneumonia should be excluded  clinically.  3/2/2025 9:19 PM by Dr. Sridhar Lopez MD on Workstation: [id number]                Results for orders placed during the hospital encounter of 03/02/25    Adult Transthoracic Echo Complete w/ Color, Spectral and Contrast if necessary per protocol    Interpretation Summary    The study is technically difficult for diagnosis    Left ventricular systolic function is hyperdynamic (EF > 70%).    Mild aortic valve stenosis is present.    Aortic valve mean pressure gradient is 16 mmHg.      Labs Pending at Discharge:  Pending Labs       Order Current Status    Blood Culture - Blood, Hand, Left Preliminary result    Blood Culture - Blood, Hand, Right Preliminary result              Time spent on Discharge including face to face service: >30 minutes    Electronically signed by Carlos Earl DO, 03/05/25, 9:04 AM EST.

## 2025-03-05 NOTE — OUTREACH NOTE
Prep Survey      Flowsheet Row Responses   [facility] facility patient discharged from? [facility]   Is LACE score < 7 ? No   Eligibility [facility]   Date of Admission 03/02/25   Date of Discharge 03/05/25   Discharge Disposition Home-Health Care Svc   Discharge diagnosis Acute hypoxic resp failure   Does the patient have one of the following disease processes/diagnoses(primary or secondary)? Other   Does the patient have Home health ordered? Yes   What is the Home health agency?  [facility]   Is there a DME ordered? No   Prep survey completed? Yes            KIKA VILLAGRAN - Registered Nurse

## 2025-03-05 NOTE — NURSING NOTE
Per order, I have discontinued the CIWA protocol. Patient has scored less than an 8 for over 24 hours. Patient has scored a 3, 0, 0, for the last 10 hours. Patient denies any pain, tremors, sweats, or feelings of withdrawal. Will continue to monitor plan of care.

## 2025-03-06 ENCOUNTER — TRANSITIONAL CARE MANAGEMENT TELEPHONE ENCOUNTER (OUTPATIENT)
Dept: CALL CENTER | Facility: HOSPITAL | Age: 85
End: 2025-03-06
Payer: MEDICARE

## 2025-03-06 NOTE — OUTREACH NOTE
Call Center TCM Note      Flowsheet Row Responses   [facility] patient discharged from? [facility]   Does the patient have one of the following disease processes/diagnoses(primary or secondary)? Other   TCM attempt successful? Yes  [vr for Toña Aldana dtr]   Call start time 1233   Call end time 1238   Discharge diagnosis Acute hypoxic resp failure   Is the patient having any side effects they believe may be caused by any medication additions or changes? No   Does the patient have all medications ordered at discharge? Yes   Prescription comments Pt reports HH reviewed meds with him, dtr is a nurse that assists with meds.  Reports issues with vision so unable to view AVS.  Pt does have all meds ordered at discharge.   Is the patient taking all medications as directed (includes completed medication regime)? Yes   Comments Hospital f/u 3/18/25@245pm. Pulmonary 3/17/25   Does the patient have an appointment with their PCP within 7-14 days of discharge? Yes   Nursing Interventions Confirmed date/time of appointment   What is the Home health agency?  [facility]    Has home health visited the patient within 72 hours of discharge? Yes   What DME was ordered? O2/nebs   Has all DME been delivered? Yes   DME comments Reports only using O2 on prn basis at this time   Psychosocial issues? No   Did the patient receive a copy of their discharge instructions? Yes   Nursing interventions Reviewed instructions with patient   What is the patient's perception of their health status since discharge? Improving  [Pt reports he is doing better now that he is home, only using O2 prn basis.  Pt voices no concerns today as reports HH as been to home this am for review. Reviewed appts in place.]   Is the patient/caregiver able to teach back signs and symptoms related to disease process for when to call PCP? Yes   TCM call completed? Yes   Call end time 1238            Shantal Quinones RN    3/6/2025, 12:45 EST

## 2025-03-08 LAB
BACTERIA SPEC AEROBE CULT: NORMAL
BACTERIA SPEC AEROBE CULT: NORMAL

## 2025-03-14 NOTE — PROGRESS NOTES
Primary Care Provider  Abby Montaño DO   Referring Provider  Carlos Earl DO      Patient Complaint  Hospital Follow Up Visit, Emphysema, Shortness of Breath, Cough (Dry cough), and Wheezing      Subjective          Rian Mckay presents to [facility]    History of Presenting Illness  Rian Mckay is a 84 y.o. male patient with COPD, emphysema, acute hypoxic respiratory failure, and tobacco use in remission, here for hospital follow-up in COPD clinic.    Patient was hospitalized at [facility] 3/2/2025 through 3/5/2025 for suspected COPD exacerbation, acute hypoxic respiratory failure.  Changes of emphysema and pleural calcified plaques noted on chest imaging.  Patient's symptoms improved with treatment of COPD exacerbation.  He did not pass 6-minute walk test prior to hospital discharge, sent home with new oxygen therapy.  Patient was also prescribed new inhalers and nebulizer treatments to use.  Today, patient states he is feeling better since being discharged in the hospital, new to our clinic.  He is accompanied by his daughter.  COPD is a new diagnosis for him, though patient has been having dyspnea for 1+ years.  At baseline, his primary respiratory complaint is dyspnea with exertion, cough.  His shortness of breath resolves once he stops to rest.  Patient has been wearing his supplemental oxygen occasionally at home, checks his O2 saturation intermittently, states it is usually greater than 90%.  He has been using his new inhalers as prescribed, reviewed today.  He is a former smoker, quit 3 years ago.  Patient denies any hemoptysis, swollen lymph nodes, weight loss, or night sweats.  He has a family history of emphysema in his father.  His wife was a patient of Dr. Meng for COPD.  Patient worked as a side farmer, was also in the  and has done various other work.  Overall, patient is doing well and has no additional concerns at this time.  Patient is able to perform  ADLs without difficulty.  I have personally reviewed the review of systems, past family, social, medical and surgical histories; and agree with their findings.    Pulmonary Meds  Symbicort  Spiriva  Albuterol inhaler  DuoNebs    Pulmonary Equipment  Oxygen therapy--since 2025      Family History   Problem Relation Age of Onset    Heart disease Mother     Osteoporosis Mother     Heart disease Father         Social History     Socioeconomic History    Marital status:    Tobacco Use    Smoking status: Former     Current packs/day: 0.00     Average packs/day: 0.3 packs/day for 69.7 years (17.4 ttl pk-yrs)     Types: Cigarettes     Start date: 1954     Quit date: 2022     Years since quittin.5     Passive exposure: Past    Smokeless tobacco: Never    Tobacco comments:     Quit this past sep    Vaping Use    Vaping status: Never Used   Substance and Sexual Activity    Alcohol use: Yes     Alcohol/week: 2.0 standard drinks of alcohol     Types: 2 Shots of liquor per week     Comment: 2 drinks per day    Drug use: Never    Sexual activity: Not Currently     Partners: Female     Birth control/protection: None        Past Medical History:   Diagnosis Date    COPD (chronic obstructive pulmonary disease)     Coronary artery disease     Heart attack     HTN (hypertension)     SOB (shortness of breath)         Immunization History   Administered Date(s) Administered    COVID-19 (MODERNA) BIVALENT 12+YRS 10/04/2022    COVID-19 (MODERNA) Monovalent Original Booster 2022       No Known Allergies       Current Outpatient Medications:     albuterol sulfate  (90 Base) MCG/ACT inhaler, Inhale 2 puffs Every 4 (Four) Hours As Needed for Wheezing., Disp: 8.5 g, Rfl: 0    amLODIPine (NORVASC) 10 MG tablet, TAKE ONE TABLET BY MOUTH ONCE DAILY, Disp: 90 tablet, Rfl: 1    aspirin 81 MG EC tablet, Take 1 tablet by mouth Daily., Disp: , Rfl:     atorvastatin (LIPITOR) 20 MG tablet, TAKE 1 TABLET BY MOUTH  "DAILY FOR 90 DAYS., Disp: 90 tablet, Rfl: 1    budesonide-formoterol (Symbicort) 160-4.5 MCG/ACT inhaler, Inhale 2 puffs 2 (Two) Times a Day., Disp: 10.2 g, Rfl: 0    fenofibrate (TRICOR) 145 MG tablet, TAKE 1 TABLET BY MOUTH DAILY., Disp: 90 tablet, Rfl: 1    ipratropium-albuterol (DUO-NEB) 0.5-2.5 mg/3 ml nebulizer, Take 3 mL by nebulization Every 4 (Four) Hours As Needed for Wheezing., Disp: 360 mL, Rfl: 0    lisinopril (PRINIVIL,ZESTRIL) 40 MG tablet, TAKE ONE TABLET BY MOUTH ONCE DAILY FOR 90 DAYS. (Patient taking differently: Take 1 tablet by mouth Daily.), Disp: 90 tablet, Rfl: 1    metoprolol succinate XL (TOPROL-XL) 50 MG 24 hr tablet, TAKE 1 TABLET BY MOUTH DAILY  DAYS., Disp: 90 tablet, Rfl: 1    Multiple Vitamins-Minerals (ICAPS AREDS 2 PO), Take 1 capsule by mouth 2 (Two) Times a Day., Disp: , Rfl:     tiotropium bromide monohydrate (SPIRIVA RESPIMAT) 2.5 MCG/ACT aerosol solution inhaler, Inhale 2 puffs Daily., Disp: 4 g, Rfl: 0    pantoprazole (Protonix) 40 MG EC tablet, Take 1 tablet by mouth Daily. (Patient not taking: Reported on 3/17/2025), Disp: 30 tablet, Rfl: 0    thiamine (VITAMIN B1) 100 MG tablet, Take 1 tablet by mouth Daily for 30 days. (Patient not taking: Reported on 3/17/2025), Disp: 30 tablet, Rfl: 0  No current facility-administered medications for this encounter.    Facility-Administered Medications Ordered in Other Encounters:     albuterol (PROVENTIL) nebulizer solution 0.083% 2.5 mg/3mL, 2.5 mg, Nebulization, Once, Taisha Sheffield APRN     Objective     Vital Signs:   /84   Pulse 89   Temp 97.7 °F (36.5 °C) (Temporal)   Resp 16   Ht 188 cm (74.02\")   Wt 98.9 kg (218 lb)   SpO2 94% Comment: room air  BMI 27.98 kg/m²     Physical Exam  Constitutional:       General: He is not in acute distress.     Appearance: Normal appearance. He is normal weight. He is not ill-appearing.   HENT:      Right Ear: Tympanic membrane and ear canal normal.      Left Ear: Tympanic " membrane and ear canal normal.      Nose: Nose normal.      Mouth/Throat:      Mouth: Mucous membranes are moist.      Pharynx: Oropharynx is clear.   Cardiovascular:      Rate and Rhythm: Normal rate and regular rhythm.      Pulses: Normal pulses.      Heart sounds: Normal heart sounds.   Pulmonary:      Effort: Pulmonary effort is normal. No respiratory distress.      Breath sounds: Normal breath sounds. No stridor. No wheezing, rhonchi or rales.   Musculoskeletal:         General: No swelling. Normal range of motion.      Cervical back: Normal range of motion and neck supple.      Right lower leg: No edema.      Left lower leg: No edema.   Skin:     General: Skin is warm and dry.   Neurological:      General: No focal deficit present.      Mental Status: He is alert and oriented to person, place, and time.      Motor: No weakness.   Psychiatric:         Mood and Affect: Mood normal.         Behavior: Behavior normal.         Result Review :   I have personally reviewed patient's labs and images.  I also reviewed Dr. Keating's hospital progress note 3/4/2025 and Dr. Earl's discharge summary 3/5/2025.    -CTA chest 3/2/2025 negative for pulmonary embolism or aortic dissection.  Changes of emphysema present.  There were also calcified pleural plaques in the left hemithorax which could reflect prior asbestos exposure.  There is mild dilatation of the descending thoracic aorta, consider follow-up in 1 year.            Diagnoses and all orders for this visit:    1. Acute hypoxic respiratory failure (Primary)    2. Pulmonary emphysema, unspecified emphysema type  -     Complete PFT - Pre & Post Bronchodilator; Future  -     Alpha - 1 - Antitrypsin; Future    3. Dyspnea, unspecified type  -     Complete PFT - Pre & Post Bronchodilator; Future  -     Alpha - 1 - Antitrypsin; Future    4. Tobacco abuse, in remission       -PFTs ordered, check alpha-1 antitrypsin  -Continue with current oxygen therapy keep O2 saturation at  or greater than 90%  -Continue using Symbicort, Spiriva daily  -Continue using albuterol inhaler and DuoNeb treatments as needed  -Follow up in 3 months with Dr. Keating, may return sooner if needed    Smoking status: Reviewed  Vaccination status: Patient declines flu, pneumonia vaccines.  Patient is advised to continue to follow CDC recommendations such as social distancing wearing a mask and washing hands for at least 20 seconds.  Medications personally reviewed    Follow Up   No follow-ups on file.  Patient was given instructions and counseling regarding his condition or for health maintenance advice. Please see specific information pulled into the AVS if appropriate.

## 2025-03-17 ENCOUNTER — HOSPITAL ENCOUNTER (OUTPATIENT)
Dept: RESPIRATORY THERAPY | Facility: HOSPITAL | Age: 85
Discharge: HOME | End: 2025-03-17
Payer: MEDICARE

## 2025-03-17 ENCOUNTER — HOSPITAL ENCOUNTER (OUTPATIENT)
Facility: HOSPITAL | Age: 85
Discharge: HOME | End: 2025-03-17
Payer: MEDICARE

## 2025-03-17 VITALS
HEART RATE: 89 BPM | BODY MASS INDEX: 27.98 KG/M2 | TEMPERATURE: 97.7 F | SYSTOLIC BLOOD PRESSURE: 159 MMHG | OXYGEN SATURATION: 94 % | HEIGHT: 74 IN | DIASTOLIC BLOOD PRESSURE: 84 MMHG | RESPIRATION RATE: 16 BRPM | WEIGHT: 218 LBS

## 2025-03-17 DIAGNOSIS — F17.201 TOBACCO ABUSE, IN REMISSION: ICD-10-CM

## 2025-03-17 DIAGNOSIS — R06.00 DYSPNEA, UNSPECIFIED TYPE: ICD-10-CM

## 2025-03-17 DIAGNOSIS — J43.9 PULMONARY EMPHYSEMA, UNSPECIFIED EMPHYSEMA TYPE: ICD-10-CM

## 2025-03-17 DIAGNOSIS — J96.01 ACUTE HYPOXIC RESPIRATORY FAILURE: Primary | ICD-10-CM

## 2025-03-17 PROCEDURE — 94729 DIFFUSING CAPACITY: CPT

## 2025-03-17 PROCEDURE — 94726 PLETHYSMOGRAPHY LUNG VOLUMES: CPT

## 2025-03-17 PROCEDURE — 94060 EVALUATION OF WHEEZING: CPT

## 2025-03-17 RX ORDER — ALBUTEROL SULFATE 0.83 MG/ML
2.5 SOLUTION RESPIRATORY (INHALATION) ONCE
Status: DISCONTINUED | OUTPATIENT
Start: 2025-03-17 | End: 2025-03-18 | Stop reason: HOSPADM

## 2025-03-18 ENCOUNTER — LAB (OUTPATIENT)
Dept: LAB | Facility: HOSPITAL | Age: 85
End: 2025-03-18
Payer: MEDICARE

## 2025-03-18 ENCOUNTER — OFFICE VISIT (OUTPATIENT)
Dept: FAMILY MEDICINE CLINIC | Facility: CLINIC | Age: 85
End: 2025-03-18
Payer: MEDICARE

## 2025-03-18 VITALS
SYSTOLIC BLOOD PRESSURE: 138 MMHG | HEART RATE: 75 BPM | RESPIRATION RATE: 18 BRPM | DIASTOLIC BLOOD PRESSURE: 77 MMHG | WEIGHT: 217.8 LBS | BODY MASS INDEX: 27.95 KG/M2 | OXYGEN SATURATION: 95 % | TEMPERATURE: 98.2 F | HEIGHT: 74 IN

## 2025-03-18 DIAGNOSIS — E55.9 VITAMIN D DEFICIENCY: ICD-10-CM

## 2025-03-18 DIAGNOSIS — D72.829 LEUKOCYTOSIS, UNSPECIFIED TYPE: ICD-10-CM

## 2025-03-18 DIAGNOSIS — Z09 HOSPITAL DISCHARGE FOLLOW-UP: Primary | ICD-10-CM

## 2025-03-18 DIAGNOSIS — R53.83 OTHER FATIGUE: ICD-10-CM

## 2025-03-18 DIAGNOSIS — I10 PRIMARY HYPERTENSION: Chronic | ICD-10-CM

## 2025-03-18 DIAGNOSIS — R81 GLUCOSURIA: ICD-10-CM

## 2025-03-18 DIAGNOSIS — K21.9 GASTROESOPHAGEAL REFLUX DISEASE WITHOUT ESOPHAGITIS: ICD-10-CM

## 2025-03-18 DIAGNOSIS — J43.2 CENTRILOBULAR EMPHYSEMA: ICD-10-CM

## 2025-03-18 PROBLEM — Z00.00 MEDICARE ANNUAL WELLNESS VISIT, SUBSEQUENT: Status: RESOLVED | Noted: 2021-07-06 | Resolved: 2025-03-18

## 2025-03-18 PROBLEM — E66.3 OVERWEIGHT WITH BODY MASS INDEX (BMI) OF 29 TO 29.9 IN ADULT: Chronic | Status: RESOLVED | Noted: 2022-07-08 | Resolved: 2025-03-18

## 2025-03-18 LAB
25(OH)D3 SERPL-MCNC: 14.4 NG/ML (ref 30–100)
ALBUMIN SERPL-MCNC: 4 G/DL (ref 3.5–5.2)
ALBUMIN/GLOB SERPL: 1.2 G/DL
ALP SERPL-CCNC: 48 U/L (ref 39–117)
ALT SERPL W P-5'-P-CCNC: 25 U/L (ref 1–41)
ANION GAP SERPL CALCULATED.3IONS-SCNC: 10.9 MMOL/L (ref 5–15)
AST SERPL-CCNC: 32 U/L (ref 1–40)
BASOPHILS # BLD AUTO: 0.06 10*3/MM3 (ref 0–0.2)
BASOPHILS NFR BLD AUTO: 0.6 % (ref 0–1.5)
BILIRUB SERPL-MCNC: 0.3 MG/DL (ref 0–1.2)
BUN SERPL-MCNC: 12 MG/DL (ref 8–23)
BUN/CREAT SERPL: 11.2 (ref 7–25)
CA-I SERPL ISE-MCNC: 1.32 MMOL/L (ref 1.15–1.35)
CALCIUM SPEC-SCNC: 9.7 MG/DL (ref 8.6–10.5)
CHLORIDE SERPL-SCNC: 108 MMOL/L (ref 98–107)
CO2 SERPL-SCNC: 21.1 MMOL/L (ref 22–29)
CREAT SERPL-MCNC: 1.07 MG/DL (ref 0.76–1.27)
DEPRECATED RDW RBC AUTO: 47.6 FL (ref 37–54)
EGFRCR SERPLBLD CKD-EPI 2021: 68 ML/MIN/1.73
EOSINOPHIL # BLD AUTO: 0.25 10*3/MM3 (ref 0–0.4)
EOSINOPHIL NFR BLD AUTO: 2.3 % (ref 0.3–6.2)
ERYTHROCYTE [DISTWIDTH] IN BLOOD BY AUTOMATED COUNT: 13.4 % (ref 12.3–15.4)
GLOBULIN UR ELPH-MCNC: 3.3 GM/DL
GLUCOSE SERPL-MCNC: 112 MG/DL (ref 65–99)
HCT VFR BLD AUTO: 47.4 % (ref 37.5–51)
HGB BLD-MCNC: 16.3 G/DL (ref 13–17.7)
IMM GRANULOCYTES # BLD AUTO: 0.04 10*3/MM3 (ref 0–0.05)
IMM GRANULOCYTES NFR BLD AUTO: 0.4 % (ref 0–0.5)
LYMPHOCYTES # BLD AUTO: 2.83 10*3/MM3 (ref 0.7–3.1)
LYMPHOCYTES NFR BLD AUTO: 26 % (ref 19.6–45.3)
MCH RBC QN AUTO: 32.9 PG (ref 26.6–33)
MCHC RBC AUTO-ENTMCNC: 34.4 G/DL (ref 31.5–35.7)
MCV RBC AUTO: 95.6 FL (ref 79–97)
MONOCYTES # BLD AUTO: 1.03 10*3/MM3 (ref 0.1–0.9)
MONOCYTES NFR BLD AUTO: 9.5 % (ref 5–12)
NEUTROPHILS NFR BLD AUTO: 6.67 10*3/MM3 (ref 1.7–7)
NEUTROPHILS NFR BLD AUTO: 61.2 % (ref 42.7–76)
NRBC BLD AUTO-RTO: 0 /100 WBC (ref 0–0.2)
PATHOLOGY REVIEW: YES
PHOSPHATE SERPL-MCNC: 2.5 MG/DL (ref 2.5–4.5)
PLATELET # BLD AUTO: 249 10*3/MM3 (ref 140–450)
PMV BLD AUTO: 10.3 FL (ref 6–12)
POTASSIUM SERPL-SCNC: 3.9 MMOL/L (ref 3.5–5.2)
PROT SERPL-MCNC: 7.3 G/DL (ref 6–8.5)
PTH-INTACT SERPL-MCNC: 87.7 PG/ML (ref 15–65)
RBC # BLD AUTO: 4.96 10*6/MM3 (ref 4.14–5.8)
SODIUM SERPL-SCNC: 140 MMOL/L (ref 136–145)
T4 FREE SERPL-MCNC: 1.28 NG/DL (ref 0.92–1.68)
TSH SERPL DL<=0.05 MIU/L-ACNC: 1.4 UIU/ML (ref 0.27–4.2)
WBC NRBC COR # BLD AUTO: 10.88 10*3/MM3 (ref 3.4–10.8)

## 2025-03-18 PROCEDURE — 84100 ASSAY OF PHOSPHORUS: CPT

## 2025-03-18 PROCEDURE — 80053 COMPREHEN METABOLIC PANEL: CPT

## 2025-03-18 PROCEDURE — 82330 ASSAY OF CALCIUM: CPT

## 2025-03-18 PROCEDURE — 84439 ASSAY OF FREE THYROXINE: CPT

## 2025-03-18 PROCEDURE — 83970 ASSAY OF PARATHORMONE: CPT

## 2025-03-18 PROCEDURE — 84443 ASSAY THYROID STIM HORMONE: CPT

## 2025-03-18 PROCEDURE — 82306 VITAMIN D 25 HYDROXY: CPT

## 2025-03-18 PROCEDURE — 36415 COLL VENOUS BLD VENIPUNCTURE: CPT

## 2025-03-18 PROCEDURE — 85025 COMPLETE CBC W/AUTO DIFF WBC: CPT

## 2025-03-18 RX ORDER — PANTOPRAZOLE SODIUM 40 MG/1
40 TABLET, DELAYED RELEASE ORAL DAILY
Qty: 90 TABLET | Refills: 1 | Status: SHIPPED | OUTPATIENT
Start: 2025-03-18

## 2025-03-18 RX ORDER — BUDESONIDE AND FORMOTEROL FUMARATE DIHYDRATE 160; 4.5 UG/1; UG/1
2 AEROSOL RESPIRATORY (INHALATION)
Qty: 10.2 G | Refills: 11 | Status: SHIPPED | OUTPATIENT
Start: 2025-03-18

## 2025-03-18 NOTE — PROGRESS NOTES
"Chief Complaint  Hospital Follow Up Visit (COPD)    Subjective    {Problem List  Visit Diagnosis   Encounters  Notes  Medications  Labs  Result Review Imaging  Media :23}    Rian Mckay presents to [facility]  History of Present Illness    Objective   Vital Signs:  /77 (BP Location: Left arm, Patient Position: Sitting, Cuff Size: Adult)   Pulse 75   Temp 98.2 °F (36.8 °C) (Temporal)   Resp 18   Ht 188 cm (74.02\")   Wt 98.8 kg (217 lb 12.8 oz)   SpO2 95%   BMI 27.95 kg/m²   Estimated body mass index is 27.95 kg/m² as calculated from the following:    Height as of this encounter: 188 cm (74.02\").    Weight as of this encounter: 98.8 kg (217 lb 12.8 oz).            Physical Exam  Vitals reviewed.   Constitutional:       Appearance: He is well-developed and overweight.   HENT:      Head: Normocephalic and atraumatic.      Right Ear: External ear normal.      Left Ear: External ear normal.      Mouth/Throat:      Pharynx: No oropharyngeal exudate.   Eyes:      Conjunctiva/sclera: Conjunctivae normal.      Pupils: Pupils are equal, round, and reactive to light.   Neck:      Vascular: No carotid bruit.   Cardiovascular:      Rate and Rhythm: Normal rate and regular rhythm.      Heart sounds: No murmur heard.     No friction rub. No gallop.   Pulmonary:      Effort: Pulmonary effort is normal.      Breath sounds: Normal breath sounds. No wheezing or rhonchi.   Abdominal:      General: There is no distension.   Skin:     General: Skin is warm and dry.   Neurological:      Mental Status: He is alert and oriented to person, place, and time.      Cranial Nerves: No cranial nerve deficit.      Motor: No weakness.   Psychiatric:         Mood and Affect: Mood and affect normal.         Behavior: Behavior normal.         Thought Content: Thought content normal.         Judgment: Judgment normal.        Result Review :{Labs  Result Review  Imaging  Med Tab  Media  " Procedures :23}  {The following data was reviewed by (Optional):[id number]}  CMP          3/3/2025    03:14 3/3/2025    06:24 3/4/2025    05:13 3/5/2025    04:52   CMP   Glucose 190  221  199  124    BUN 17  17  18  19    Creatinine 1.13  1.00  0.94  0.82    EGFR 63.7  73.8  79.4  86.1    Sodium 137  135  139  138    Potassium 4.3  3.9  4.2  3.5    Chloride 104  102  107  104    Calcium 10.7  9.6  9.8  9.2    BUN/Creatinine Ratio 15.0  17.0  19.1  23.2    Anion Gap 18.2  15.6  12.4  11.2      CBC          3/3/2025    03:14 3/3/2025    06:24 3/4/2025    05:13 3/5/2025    04:52   CBC   WBC 18.87  21.43  17.94  17.55    RBC 4.83  4.75  4.45  4.48    Hemoglobin 15.3  15.6  14.2  14.4    Hematocrit 45.8  46.1  43.5  43.4    MCV 94.8  97.1  97.8  96.9    MCH 31.7  32.8  31.9  32.1    MCHC 33.4  33.8  32.6  33.2    RDW 14.6  14.5  15.1  15.0    Platelets 260  261  247  253      Lipid Panel          12/23/2024    10:51   Lipid Panel   Total Cholesterol 179    Triglycerides 280    HDL Cholesterol 42    VLDL Cholesterol 47    LDL Cholesterol  90    LDL/HDL Ratio 1.93      TSH          12/23/2024    10:51   TSH   TSH 1.820      {Data reviewed (Optional):95344:::1}          Assessment and Plan {CC Problem List  Visit Diagnosis   ROS  Review (Popup)  Health Maintenance  Quality  BestPractice  Medications  SmartSets  SnapShot Encounters  Media :23}  There are no diagnoses linked to this encounter.       {Time Spent (Optional):78411}  Follow Up {Instructions Charge Capture  Follow-up Communications :23}  No follow-ups on file.  Patient was given instructions and counseling regarding his condition or for health maintenance advice. Please see specific information pulled into the AVS if appropriate.

## 2025-03-18 NOTE — ASSESSMENT & PLAN NOTE
COPD is improving with treatment.    Plan:  Continue same medication/s without change.    Discussed medication dosage, use, side effects, and goals of treatment in detail.    Warning signs of respiratory distress were reviewed with the patient.   PFT ordered. .    Patient Treatment Goals:   symptom prevention, minimizing limitation in activity, prevention of exacerbations and use of ER/inpatient care, maintenance of optimal pulmonary function, and minimization of adverse effects of treatment    Followup in 6 months.

## 2025-03-18 NOTE — PROGRESS NOTES
Transitional Care Follow Up Visit  Subjective     Rian Mckay is a 84 y.o. male who presents for a transitional care management visit.    Within 48 business hours after discharge our office contacted him via telephone to coordinate his care and needs.      I reviewed and discussed the details of that call along with the discharge summary, hospital problems, inpatient lab results, inpatient diagnostic studies, and consultation reports with Rain.     Current outpatient and discharge medications have been reconciled for the patient.  Reviewed by: Abby Montaño DO          3/5/2025     5:38 PM   Date of TCM Phone Call   [facility]   Date of Admission 3/2/2025   Date of Discharge 3/5/2025   Discharge Disposition [facility]-Health Care [facility] [place]     Risk for Readmission (LACE) Score: 7 (3/5/2025  6:00 AM)      History of Present Illness  He has been doing well since hospital DC. He is using his inhalers as prescribed.      Course During Hospital Stay:  Rian Mckay is a 84 y.o. male  with emphysema, hypertension, coronary artery disease with remote history of stenting who presented with shortness of breath.  SpO2 at home reported to be in the 80s.  On presentation SpO2 was 87% on room air.  WBC 12.2, lactate 2.7, CO2 20.8, anion gap 16.  ABG pH 7.4, pCO2 32.5, pO2 56 on 4 L nasal cannula.  CT angiogram chest no PE, changes of emphysema and reported pleural calcified plaques in the left hemithorax.  Seen by pulmonology.  Responded to bronchodilators, steroids.  Infectious workup negative.Chest pain-free. Troponin negative x 2. proBNP normal.  2D echo preserved EF, no significant diastolic dysfunction or valvular disease.  Monitor for alcohol withdrawal but did not show any signs.  She requirements decreased, underwent 6-minute walk test with SpO2 87% on room air, requiring 3 L nasal cannula with exertion to make SpO2 90% and above.  Home oxygen was arranged along with nebulizer, Symbicort and Spiriva, as needed  "Ray.  Will have close follow-up with pulmonology.  Discharged home in stable condition      The following portions of the patient's history were reviewed and updated as appropriate: allergies, current medications, past family history, past medical history, past social history, past surgical history, and problem list.    Review of Systems    Objective   /77 (BP Location: Left arm, Patient Position: Sitting, Cuff Size: Adult)   Pulse 75   Temp 98.2 °F (36.8 °C) (Temporal)   Resp 18   Ht 188 cm (74.02\")   Wt 98.8 kg (217 lb 12.8 oz)   SpO2 95%   BMI 27.95 kg/m²   Physical Exam    Assessment & Plan   Diagnoses and all orders for this visit:    1. Hospital discharge follow-up (Primary)    2. Primary hypertension  Assessment & Plan:  Hypertension is stable and controlled  Continue current treatment regimen.  Dietary sodium restriction.  Weight loss.  Blood pressure will be reassessed in 6 months.          3. Centrilobular emphysema  Assessment & Plan:  COPD is improving with treatment.    Plan:  Continue same medication/s without change.    Discussed medication dosage, use, side effects, and goals of treatment in detail.    Warning signs of respiratory distress were reviewed with the patient.   PFT ordered. .    Patient Treatment Goals:   symptom prevention, minimizing limitation in activity, prevention of exacerbations and use of ER/inpatient care, maintenance of optimal pulmonary function, and minimization of adverse effects of treatment    Followup in 6 months.    Orders:  -     budesonide-formoterol (Symbicort) 160-4.5 MCG/ACT inhaler; Inhale 2 puffs 2 (Two) Times a Day.  Dispense: 10.2 g; Refill: 11    4. Other fatigue  -     CBC w AUTO Differential; Future  -     PTH, Intact; Future  -     Comprehensive metabolic panel; Future  -     TSH+Free T4; Future  -     Calcium, Ionized; Future  -     Phosphorus; Future    5. Gastroesophageal reflux disease without esophagitis  -     pantoprazole (Protonix) " 40 MG EC tablet; Take 1 tablet by mouth Daily.  Dispense: 90 tablet; Refill: 1    6. Glucosuria  -     Urinalysis With Microscopic - Urine, Clean Catch; Future    7. Vitamin D deficiency  -     Vitamin D 25 hydroxy; Future

## 2025-03-19 LAB
LAB AP CASE REPORT: NORMAL
PATH REPORT.FINAL DX SPEC: NORMAL
PATH REPORT.GROSS SPEC: NORMAL
QT INTERVAL: 353 MS
QTC INTERVAL: 439 MS

## 2025-03-24 DIAGNOSIS — R06.00 DYSPNEA, UNSPECIFIED TYPE: ICD-10-CM

## 2025-03-24 DIAGNOSIS — J43.9 PULMONARY EMPHYSEMA, UNSPECIFIED EMPHYSEMA TYPE: ICD-10-CM

## 2025-06-11 NOTE — PROGRESS NOTES
Primary Care Provider  Abby Montaño DO     Referring Provider  No ref. provider found       Patient or patient representative verbalized consent for the use of Ambient Listening during the visit with  DEEPA Lakhani for chart documentation. 6/13/2025  15:28 EDT    Chief Complaint  Follow-up (3 month. ), Emphysema, Shortness of Breath, and Wheezing    Subjective          Rian Mckay presents to [facility]  History of Present Illness  Rian Mckay is a 84 y.o. male patient here for management of emphysema, shortness of breath and tobacco abuse of cigarettes in remission.    3/17/2025 PFT: moderate COPD    History of Present Illness  The patient is an 84-year-old male who presents for evaluation of wheezing, shortness of breath, and neuropathy.    He reports experiencing nasal and sinus drainage when seated in his recliner but does not have a productive cough. His wheezing has significantly improved since starting Symbicort and Spiriva in May 2025. He has not required the use of his breathing treatments. He was discharged from the hospital with oxygen therapy, which he has used only three times. He attempted to discontinue one of his medications, but this resulted in a decrease in his oxygen saturation levels, prompting him to refill the prescription. He takes his medication immediately upon waking, at which time his oxygen saturation is typically around 91 or 92. After having coffee, his oxygen saturation increases slightly.    He experiences shortness of breath during physical exertion, such as walking half a mile. Despite this, he maintains an active lifestyle, walking approximately 200 feet to the mailbox and back daily, a round trip of 500 feet. He also uses a walker for balance due to neuropathy, which has left him unable to feel his feet. He experienced a fall five years ago while vacuuming, resulting in a hip  fracture.      MEDICATIONS  Symbicort, Spiriva       His history of smoking is   Tobacco Use: Medium Risk (2025)    Patient History     Smoking Tobacco Use: Former     Smokeless Tobacco Use: Never     Passive Exposure: Past   .    Review of Systems   Constitutional:  Negative for chills, fatigue, fever, unexpected weight gain and unexpected weight loss.   HENT:  Congestion: Nasal.    Respiratory:  Positive for shortness of breath. Negative for apnea, cough and wheezing.         Negative for Hemoptysis     Cardiovascular:  Negative for chest pain, palpitations and leg swelling.   Skin:         Negative for cyanosis      Sleep: Negative for Excessive daytime sleepiness  Negative for morning headaches  Negative for Snoring    Family History   Problem Relation Age of Onset    Heart disease Mother     Osteoporosis Mother     Heart disease Father         Social History     Socioeconomic History    Marital status:    Tobacco Use    Smoking status: Former     Current packs/day: 0.00     Average packs/day: 0.3 packs/day for 69.7 years (17.4 ttl pk-yrs)     Types: Cigarettes     Start date: 1953     Quit date: 2022     Years since quittin.7     Passive exposure: Past    Smokeless tobacco: Never    Tobacco comments:     Quit this past sep    Vaping Use    Vaping status: Never Used   Substance and Sexual Activity    Alcohol use: Yes     Alcohol/week: 2.0 standard drinks of alcohol     Types: 2 Shots of liquor per week     Comment: 2 drinks per day    Drug use: Never    Sexual activity: Not Currently     Partners: Female     Birth control/protection: None        Past Medical History:   Diagnosis Date    COPD (chronic obstructive pulmonary disease)     Coronary artery disease     Heart attack     HTN (hypertension)     SOB (shortness of breath)         Immunization History   Administered Date(s) Administered    COVID-19 (MODERNA) BIVALENT 12+YRS 10/04/2022    COVID-19 (MODERNA) Monovalent Original Booster  04/12/2022    Fluzone (or Fluarix & Flulaval for VFC) >6mos 10/17/2017         No Known Allergies       Current Outpatient Medications:     albuterol sulfate  (90 Base) MCG/ACT inhaler, Inhale 2 puffs Every 4 (Four) Hours As Needed for Wheezing., Disp: 8.5 g, Rfl: 0    amLODIPine (NORVASC) 10 MG tablet, TAKE ONE TABLET BY MOUTH ONCE DAILY, Disp: 90 tablet, Rfl: 1    aspirin 81 MG EC tablet, Take 1 tablet by mouth Daily., Disp: , Rfl:     atorvastatin (LIPITOR) 20 MG tablet, TAKE 1 TABLET BY MOUTH DAILY FOR 90 DAYS., Disp: 90 tablet, Rfl: 1    budesonide-formoterol (Symbicort) 160-4.5 MCG/ACT inhaler, Inhale 2 puffs 2 (Two) Times a Day., Disp: 10.2 g, Rfl: 11    fenofibrate (TRICOR) 145 MG tablet, TAKE 1 TABLET BY MOUTH DAILY., Disp: 90 tablet, Rfl: 1    ipratropium-albuterol (DUO-NEB) 0.5-2.5 mg/3 ml nebulizer, Take 3 mL by nebulization Every 4 (Four) Hours As Needed for Wheezing., Disp: 360 mL, Rfl: 0    lisinopril (PRINIVIL,ZESTRIL) 40 MG tablet, TAKE ONE TABLET BY MOUTH ONCE DAILY FOR 90 DAYS. (Patient taking differently: Take 1 tablet by mouth Daily.), Disp: 90 tablet, Rfl: 1    metoprolol succinate XL (TOPROL-XL) 50 MG 24 hr tablet, TAKE 1 TABLET BY MOUTH DAILY  DAYS., Disp: 90 tablet, Rfl: 1    Multiple Vitamins-Minerals (ICAPS AREDS 2 PO), Take 1 capsule by mouth 2 (Two) Times a Day., Disp: , Rfl:     pantoprazole (Protonix) 40 MG EC tablet, Take 1 tablet by mouth Daily., Disp: 90 tablet, Rfl: 1    tiotropium bromide monohydrate (SPIRIVA RESPIMAT) 2.5 MCG/ACT aerosol solution inhaler, Inhale 2 puffs Daily., Disp: 12 g, Rfl: 3    vitamin D (ERGOCALCIFEROL) 1.25 MG (64357 UT) capsule capsule, Take 1 capsule by mouth 1 (One) Time Per Week., Disp: 12 capsule, Rfl: 1     Objective   Physical Exam  Constitutional:       General: He is not in acute distress.     Appearance: Normal appearance. He is normal weight.   HENT:      Right Ear: Hearing normal.      Left Ear: Hearing normal.      Nose: No nasal  "tenderness or congestion.      Mouth/Throat:      Mouth: Mucous membranes are moist. No oral lesions.   Eyes:      Extraocular Movements: Extraocular movements intact.      Pupils: Pupils are equal, round, and reactive to light.   Cardiovascular:      Rate and Rhythm: Normal rate and regular rhythm.      Pulses: Normal pulses.      Heart sounds: Normal heart sounds. No murmur heard.  Pulmonary:      Effort: Pulmonary effort is normal.      Breath sounds: Normal breath sounds. No wheezing, rhonchi or rales.   Musculoskeletal:      Right lower leg: No edema.      Left lower leg: No edema.   Skin:     General: Skin is warm and dry.      Findings: No lesion or rash.   Neurological:      General: No focal deficit present.      Mental Status: He is alert and oriented to person, place, and time.   Psychiatric:         Mood and Affect: Affect normal. Mood is not anxious or depressed.         Vital Signs:   BP (!) 162/104 (BP Location: Left arm, Patient Position: Sitting, Cuff Size: Large Adult)   Pulse 84   Temp 97.9 °F (36.6 °C) (Oral)   Resp 18   Ht 188 cm (74.02\")   Wt 105 kg (231 lb 3.2 oz)   SpO2 94% Comment: room air  BMI 29.67 kg/m²        Result Review :   The following data was reviewed by: DEEPA Lakhani on 06/12/2025:  CMP          3/4/2025    05:13 3/5/2025    04:52 3/18/2025    16:22   CMP   Glucose 199  124  112    BUN 18  19  12    Creatinine 0.94  0.82  1.07    EGFR 79.4  86.1  68.0    Sodium 139  138  140    Potassium 4.2  3.5  3.9    Chloride 107  104  108    Calcium 9.8  9.2  9.7    Total Protein   7.3    Albumin   4.0    Globulin   3.3    Total Bilirubin   0.3    Alkaline Phosphatase   48    AST (SGOT)   32    ALT (SGPT)   25    Albumin/Globulin Ratio   1.2    BUN/Creatinine Ratio 19.1  23.2  11.2    Anion Gap 12.4  11.2  10.9      CBC w/diff          3/4/2025    05:13 3/5/2025    04:52 3/18/2025    16:22   CBC w/Diff   WBC 17.94  17.55  10.88    RBC 4.45  4.48  4.96    Hemoglobin 14.2  14.4 "  16.3    Hematocrit 43.5  43.4  47.4    MCV 97.8  96.9  95.6    MCH 31.9  32.1  32.9    MCHC 32.6  33.2  34.4    RDW 15.1  15.0  13.4    Platelets 247  253  249    Neutrophil Rel % 88.2   61.2    Immature Granulocyte Rel % 1.2   0.4    Lymphocyte Rel % 6.9   26.0    Monocyte Rel % 3.5   9.5    Eosinophil Rel % 0.0   2.3    Basophil Rel % 0.2   0.6      Data reviewed : Radiologic studies Chest CT 3/2/2025, PFT 3/17/2025 and DEEPA Mg last office note   Procedures        Assessment and Plan    Diagnoses and all orders for this visit:    1. Chronic obstructive pulmonary disease, unspecified COPD type (Primary)  Comments:  continue Symbicort and Spiriva    2. Pulmonary emphysema, unspecified emphysema type  Comments:  continue Symbicort and Spiriva    3. Dyspnea on exertion  Comments:  albuterol as needed    4. Nicotine dependence, cigarettes, in remission        Assessment & Plan  1. Wheezing.  His wheezing has significantly improved since starting Symbicort and Spiriva in May 2025. He has not needed to use his breathing treatments. The current treatment regimen will be maintained without any modifications.    2. Shortness of breath.  He experiences shortness of breath, especially when walking long distances. His oxygen saturation levels are generally above 90%, so continuous oxygen use is not necessary. He should use oxygen only when his saturation drops below 90% or if he feels short of breath.    3. Neuropathy.  He has neuropathy and uses a cane for balance. He is advised to continue using the cane to prevent falls.    Follow-up  The patient will follow up in October 2025.          Follow Up   Return in about 4 months (around 10/12/2025) for Recheck with Zuri.  Patient was given instructions and counseling regarding his condition or for health maintenance advice. Please see specific information pulled into the AVS if appropriate.

## 2025-06-12 ENCOUNTER — OFFICE VISIT (OUTPATIENT)
Dept: PULMONOLOGY | Facility: CLINIC | Age: 85
End: 2025-06-12
Payer: MEDICARE

## 2025-06-12 VITALS
OXYGEN SATURATION: 94 % | BODY MASS INDEX: 29.67 KG/M2 | RESPIRATION RATE: 18 BRPM | HEART RATE: 84 BPM | TEMPERATURE: 97.9 F | HEIGHT: 74 IN | WEIGHT: 231.2 LBS | DIASTOLIC BLOOD PRESSURE: 104 MMHG | SYSTOLIC BLOOD PRESSURE: 162 MMHG

## 2025-06-12 DIAGNOSIS — J44.9 CHRONIC OBSTRUCTIVE PULMONARY DISEASE, UNSPECIFIED COPD TYPE: Primary | Chronic | ICD-10-CM

## 2025-06-12 DIAGNOSIS — R06.09 DYSPNEA ON EXERTION: ICD-10-CM

## 2025-06-12 DIAGNOSIS — F17.211 NICOTINE DEPENDENCE, CIGARETTES, IN REMISSION: ICD-10-CM

## 2025-06-12 DIAGNOSIS — J43.9 PULMONARY EMPHYSEMA, UNSPECIFIED EMPHYSEMA TYPE: Chronic | ICD-10-CM

## 2025-06-26 ENCOUNTER — TELEPHONE (OUTPATIENT)
Dept: FAMILY MEDICINE CLINIC | Facility: CLINIC | Age: 85
End: 2025-06-26
Payer: MEDICARE

## 2025-07-08 ENCOUNTER — OFFICE VISIT (OUTPATIENT)
Dept: FAMILY MEDICINE CLINIC | Facility: CLINIC | Age: 85
End: 2025-07-08
Payer: MEDICARE

## 2025-07-08 ENCOUNTER — LAB (OUTPATIENT)
Dept: LAB | Facility: HOSPITAL | Age: 85
End: 2025-07-08
Payer: MEDICARE

## 2025-07-08 VITALS
HEIGHT: 74 IN | DIASTOLIC BLOOD PRESSURE: 89 MMHG | TEMPERATURE: 98.3 F | SYSTOLIC BLOOD PRESSURE: 155 MMHG | BODY MASS INDEX: 30.11 KG/M2 | RESPIRATION RATE: 19 BRPM | HEART RATE: 91 BPM | OXYGEN SATURATION: 95 % | WEIGHT: 234.6 LBS

## 2025-07-08 DIAGNOSIS — I10 PRIMARY HYPERTENSION: Chronic | ICD-10-CM

## 2025-07-08 DIAGNOSIS — E55.9 VITAMIN D DEFICIENCY: ICD-10-CM

## 2025-07-08 DIAGNOSIS — E78.2 MIXED HYPERLIPIDEMIA: Chronic | ICD-10-CM

## 2025-07-08 DIAGNOSIS — J43.2 CENTRILOBULAR EMPHYSEMA: Chronic | ICD-10-CM

## 2025-07-08 DIAGNOSIS — Z00.00 ANNUAL PHYSICAL EXAM: ICD-10-CM

## 2025-07-08 DIAGNOSIS — E66.811 CLASS 1 OBESITY DUE TO EXCESS CALORIES WITH SERIOUS COMORBIDITY AND BODY MASS INDEX (BMI) OF 30.0 TO 30.9 IN ADULT: Chronic | ICD-10-CM

## 2025-07-08 DIAGNOSIS — E66.09 CLASS 1 OBESITY DUE TO EXCESS CALORIES WITH SERIOUS COMORBIDITY AND BODY MASS INDEX (BMI) OF 30.0 TO 30.9 IN ADULT: Chronic | ICD-10-CM

## 2025-07-08 DIAGNOSIS — E55.9 VITAMIN D DEFICIENCY: Primary | ICD-10-CM

## 2025-07-08 PROBLEM — J96.01 ACUTE HYPOXIC RESPIRATORY FAILURE: Status: RESOLVED | Noted: 2025-03-02 | Resolved: 2025-07-08

## 2025-07-08 PROBLEM — Z09 HOSPITAL DISCHARGE FOLLOW-UP: Status: RESOLVED | Noted: 2025-03-18 | Resolved: 2025-07-08

## 2025-07-08 LAB
25(OH)D3 SERPL-MCNC: 27 NG/ML (ref 30–100)
ALBUMIN SERPL-MCNC: 4.4 G/DL (ref 3.5–5.2)
ALBUMIN/GLOB SERPL: 1.3 G/DL
ALP SERPL-CCNC: 55 U/L (ref 39–117)
ALT SERPL W P-5'-P-CCNC: 14 U/L (ref 1–41)
ANION GAP SERPL CALCULATED.3IONS-SCNC: 16.6 MMOL/L (ref 5–15)
AST SERPL-CCNC: 20 U/L (ref 1–40)
BASOPHILS # BLD AUTO: 0.11 10*3/MM3 (ref 0–0.2)
BASOPHILS NFR BLD AUTO: 1.1 % (ref 0–1.5)
BILIRUB SERPL-MCNC: 0.4 MG/DL (ref 0–1.2)
BUN SERPL-MCNC: 10 MG/DL (ref 8–23)
BUN/CREAT SERPL: 11 (ref 7–25)
CALCIUM SPEC-SCNC: 10.7 MG/DL (ref 8.6–10.5)
CHLORIDE SERPL-SCNC: 101 MMOL/L (ref 98–107)
CHOLEST SERPL-MCNC: 185 MG/DL (ref 0–200)
CO2 SERPL-SCNC: 20.4 MMOL/L (ref 22–29)
CREAT SERPL-MCNC: 0.91 MG/DL (ref 0.76–1.27)
DEPRECATED RDW RBC AUTO: 48.7 FL (ref 37–54)
EGFRCR SERPLBLD CKD-EPI 2021: 82.6 ML/MIN/1.73
EOSINOPHIL # BLD AUTO: 0.26 10*3/MM3 (ref 0–0.4)
EOSINOPHIL NFR BLD AUTO: 2.5 % (ref 0.3–6.2)
ERYTHROCYTE [DISTWIDTH] IN BLOOD BY AUTOMATED COUNT: 13.5 % (ref 12.3–15.4)
GLOBULIN UR ELPH-MCNC: 3.3 GM/DL
GLUCOSE SERPL-MCNC: 108 MG/DL (ref 65–99)
HCT VFR BLD AUTO: 50.7 % (ref 37.5–51)
HDLC SERPL-MCNC: 44 MG/DL (ref 40–60)
HGB BLD-MCNC: 17 G/DL (ref 13–17.7)
IMM GRANULOCYTES # BLD AUTO: 0.06 10*3/MM3 (ref 0–0.05)
IMM GRANULOCYTES NFR BLD AUTO: 0.6 % (ref 0–0.5)
LDLC SERPL CALC-MCNC: 93 MG/DL (ref 0–100)
LDLC/HDLC SERPL: 1.9 {RATIO}
LYMPHOCYTES # BLD AUTO: 3.07 10*3/MM3 (ref 0.7–3.1)
LYMPHOCYTES NFR BLD AUTO: 29.9 % (ref 19.6–45.3)
MCH RBC QN AUTO: 32.9 PG (ref 26.6–33)
MCHC RBC AUTO-ENTMCNC: 33.5 G/DL (ref 31.5–35.7)
MCV RBC AUTO: 98.1 FL (ref 79–97)
MONOCYTES # BLD AUTO: 0.9 10*3/MM3 (ref 0.1–0.9)
MONOCYTES NFR BLD AUTO: 8.8 % (ref 5–12)
NEUTROPHILS NFR BLD AUTO: 5.88 10*3/MM3 (ref 1.7–7)
NEUTROPHILS NFR BLD AUTO: 57.1 % (ref 42.7–76)
NRBC BLD AUTO-RTO: 0 /100 WBC (ref 0–0.2)
PLATELET # BLD AUTO: 291 10*3/MM3 (ref 140–450)
PMV BLD AUTO: 9.9 FL (ref 6–12)
POTASSIUM SERPL-SCNC: 3.8 MMOL/L (ref 3.5–5.2)
PROT SERPL-MCNC: 7.7 G/DL (ref 6–8.5)
RBC # BLD AUTO: 5.17 10*6/MM3 (ref 4.14–5.8)
SODIUM SERPL-SCNC: 138 MMOL/L (ref 136–145)
T4 FREE SERPL-MCNC: 1.16 NG/DL (ref 0.92–1.68)
TRIGL SERPL-MCNC: 286 MG/DL (ref 0–150)
TSH SERPL DL<=0.05 MIU/L-ACNC: 1.54 UIU/ML (ref 0.27–4.2)
VLDLC SERPL-MCNC: 48 MG/DL (ref 5–40)
WBC NRBC COR # BLD AUTO: 10.28 10*3/MM3 (ref 3.4–10.8)

## 2025-07-08 PROCEDURE — 84439 ASSAY OF FREE THYROXINE: CPT

## 2025-07-08 PROCEDURE — 3079F DIAST BP 80-89 MM HG: CPT | Performed by: FAMILY MEDICINE

## 2025-07-08 PROCEDURE — 85025 COMPLETE CBC W/AUTO DIFF WBC: CPT

## 2025-07-08 PROCEDURE — 1160F RVW MEDS BY RX/DR IN RCRD: CPT | Performed by: FAMILY MEDICINE

## 2025-07-08 PROCEDURE — 80053 COMPREHEN METABOLIC PANEL: CPT

## 2025-07-08 PROCEDURE — 80061 LIPID PANEL: CPT

## 2025-07-08 PROCEDURE — 84443 ASSAY THYROID STIM HORMONE: CPT

## 2025-07-08 PROCEDURE — 36415 COLL VENOUS BLD VENIPUNCTURE: CPT

## 2025-07-08 PROCEDURE — 1159F MED LIST DOCD IN RCRD: CPT | Performed by: FAMILY MEDICINE

## 2025-07-08 PROCEDURE — 99214 OFFICE O/P EST MOD 30 MIN: CPT | Performed by: FAMILY MEDICINE

## 2025-07-08 PROCEDURE — 82306 VITAMIN D 25 HYDROXY: CPT

## 2025-07-08 PROCEDURE — 1126F AMNT PAIN NOTED NONE PRSNT: CPT | Performed by: FAMILY MEDICINE

## 2025-07-08 PROCEDURE — 3077F SYST BP >= 140 MM HG: CPT | Performed by: FAMILY MEDICINE

## 2025-07-08 PROCEDURE — G2211 COMPLEX E/M VISIT ADD ON: HCPCS | Performed by: FAMILY MEDICINE

## 2025-07-08 NOTE — PROGRESS NOTES
"Chief Complaint  Peripheral Neuropathy, Hypertension, and Shortness of Breath    Subjective        Rian Mckay presents to [facility]  History of Present Illness  He is here today for management of his chronic medical conditions. He has hypertension, hyperlipidemia, basal cell skin cancer and coronary artery disease.  He is a  and had 2 daughters.     He is getting injections for dry macular degeneration.      He has 15 lbs pounds since his last visit.     Does have exertional dyspnea.  He was in the hospital for hypoxic failure earlier this year.    The patient has no other complaints today and denies chest pain, weakness, numbness, nausea, vomiting, diarrhea, dizziness or syncopal event.               Objective   Vital Signs:  /89 (BP Location: Right arm, Patient Position: Sitting, Cuff Size: Adult)   Pulse 91   Temp 98.3 °F (36.8 °C) (Temporal)   Resp 19   Ht 188 cm (74.02\")   Wt 106 kg (234 lb 9.6 oz)   SpO2 95%   BMI 30.11 kg/m²   Estimated body mass index is 30.11 kg/m² as calculated from the following:    Height as of this encounter: 188 cm (74.02\").    Weight as of this encounter: 106 kg (234 lb 9.6 oz).            Physical Exam  Vitals reviewed.   Constitutional:       Appearance: He is well-developed. He is obese.   HENT:      Head: Normocephalic and atraumatic.      Right Ear: External ear normal.      Left Ear: External ear normal.      Mouth/Throat:      Pharynx: No oropharyngeal exudate.   Eyes:      Conjunctiva/sclera: Conjunctivae normal.      Pupils: Pupils are equal, round, and reactive to light.   Neck:      Vascular: No carotid bruit.   Cardiovascular:      Rate and Rhythm: Normal rate and regular rhythm.      Heart sounds: No murmur heard.     No friction rub. No gallop.   Pulmonary:      Effort: Pulmonary effort is normal.      Breath sounds: Normal breath sounds. No wheezing or rhonchi.   Abdominal:      General: There is no distension. "   Skin:     General: Skin is warm and dry.   Neurological:      Mental Status: He is alert and oriented to person, place, and time.      Cranial Nerves: No cranial nerve deficit.      Motor: No weakness.   Psychiatric:         Mood and Affect: Mood and affect normal.         Behavior: Behavior normal.         Thought Content: Thought content normal.         Judgment: Judgment normal.        Result Review :    CMP          3/4/2025    05:13 3/5/2025    04:52 3/18/2025    16:22   CMP   Glucose 199  124  112    BUN 18  19  12    Creatinine 0.94  0.82  1.07    EGFR 79.4  86.1  68.0    Sodium 139  138  140    Potassium 4.2  3.5  3.9    Chloride 107  104  108    Calcium 9.8  9.2  9.7    Total Protein   7.3    Albumin   4.0    Globulin   3.3    Total Bilirubin   0.3    Alkaline Phosphatase   48    AST (SGOT)   32    ALT (SGPT)   25    Albumin/Globulin Ratio   1.2    BUN/Creatinine Ratio 19.1  23.2  11.2    Anion Gap 12.4  11.2  10.9      CBC          3/4/2025    05:13 3/5/2025    04:52 3/18/2025    16:22   CBC   WBC 17.94  17.55  10.88    RBC 4.45  4.48  4.96    Hemoglobin 14.2  14.4  16.3    Hematocrit 43.5  43.4  47.4    MCV 97.8  96.9  95.6    MCH 31.9  32.1  32.9    MCHC 32.6  33.2  34.4    RDW 15.1  15.0  13.4    Platelets 247  253  249      Lipid Panel          12/23/2024    10:51   Lipid Panel   Total Cholesterol 179    Triglycerides 280    HDL Cholesterol 42    VLDL Cholesterol 47    LDL Cholesterol  90    LDL/HDL Ratio 1.93      TSH          12/23/2024    10:51 3/18/2025    16:22   TSH   TSH 1.820  1.400                Assessment and Plan   Diagnoses and all orders for this visit:    1. Vitamin D deficiency (Primary)  Comments:  He was encouraged to continue taking his vitamin D as prescribed.  Repeat vitamin D lab ordered today.  Orders:  -     Vitamin D 25 hydroxy; Future    2. Class 1 obesity due to excess calories with serious comorbidity and body mass index (BMI) of 30.0 to 30.9 in adult  Assessment &  Plan:  Patient's (Body mass index is 30.11 kg/m².) indicates that they are obese (BMI >30) with health conditions that include hypertension and dyslipidemias . Weight is worsening. BMI  is above average; BMI management plan is completed. We discussed low calorie, low carb based diet program, portion control, and increasing exercise.       3. Mixed hyperlipidemia  Assessment & Plan:   Lipid abnormalities are improving with treatment    Plan:  Continue same medication/s without change.      Discussed medication dosage, use, side effects, and goals of treatment in detail.    Counseled patient on lifestyle modifications to help control hyperlipidemia.   Cholesterol lowering dietary information shared with patient.    Patient Treatment Goals:   LDL goal is under 100    Followup in 6 months.    Orders:  -     Lipid Panel; Future    4. Primary hypertension  Assessment & Plan:  Hypertension is uncontrolled  Dietary sodium restriction.  Weight loss.  Blood pressure will be reassessedin 6 months.    I encouraged the patient to add hydrochlorothiazide to his medical regimen.  He refused.  I encouraged him to increase his metoprolol to 100 mg from 50 mg daily.  He refused.  Will continue to monitor his blood pressure moving forward and if it does answer upward further then maybe we can make adjustments at that time.      5. Centrilobular emphysema  Assessment & Plan:  COPD is improving with treatment.    Plan:  Continue same medication/s without change.    Discussed medication dosage, use, side effects, and goals of treatment in detail.    Warning signs of respiratory distress were reviewed with the patient.   PFT ordered. .    Patient Treatment Goals:   symptom prevention, minimizing limitation in activity, prevention of exacerbations and use of ER/inpatient care, maintenance of optimal pulmonary function, and minimization of adverse effects of treatment    Followup in 6 months.      6. Annual physical exam  -     TSH+Free T4;  Future  -     Comprehensive Metabolic Panel; Future  -     CBC & Differential; Future           Follow Up   Return in about 6 months (around 1/8/2026).  Patient was given instructions and counseling regarding his condition or for health maintenance advice. Please see specific information pulled into the AVS if appropriate.         Patient or patient representative verbalized consent for the use of Ambient Listening during the visit with  Abby Montaño DO for chart documentation. 7/8/2025  14:10 EDT

## 2025-07-08 NOTE — ASSESSMENT & PLAN NOTE
Patient's (Body mass index is 30.11 kg/m².) indicates that they are obese (BMI >30) with health conditions that include hypertension and dyslipidemias . Weight is worsening. BMI  is above average; BMI management plan is completed. We discussed low calorie, low carb based diet program, portion control, and increasing exercise.

## 2025-07-08 NOTE — ASSESSMENT & PLAN NOTE
Hypertension is uncontrolled  Dietary sodium restriction.  Weight loss.  Blood pressure will be reassessedin 6 months.    I encouraged the patient to add hydrochlorothiazide to his medical regimen.  He refused.  I encouraged him to increase his metoprolol to 100 mg from 50 mg daily.  He refused.  Will continue to monitor his blood pressure moving forward and if it does answer upward further then maybe we can make adjustments at that time.

## 2025-07-14 RX ORDER — LISINOPRIL 40 MG/1
40 TABLET ORAL DAILY
Qty: 90 TABLET | Refills: 1 | Status: SHIPPED | OUTPATIENT
Start: 2025-07-14 | End: 2025-10-12

## 2025-07-14 RX ORDER — ATORVASTATIN CALCIUM 20 MG/1
20 TABLET, FILM COATED ORAL DAILY
Qty: 90 TABLET | Refills: 1 | Status: SHIPPED | OUTPATIENT
Start: 2025-07-14 | End: 2025-10-12

## 2025-07-14 RX ORDER — METOPROLOL SUCCINATE 50 MG/1
50 TABLET, EXTENDED RELEASE ORAL DAILY
Qty: 90 TABLET | Refills: 1 | Status: SHIPPED | OUTPATIENT
Start: 2025-07-14 | End: 2026-01-10

## 2025-07-14 RX ORDER — FENOFIBRATE 145 MG/1
145 TABLET, FILM COATED ORAL DAILY
Qty: 90 TABLET | Refills: 1 | Status: SHIPPED | OUTPATIENT
Start: 2025-07-14

## 2025-07-14 RX ORDER — AMLODIPINE BESYLATE 10 MG/1
10 TABLET ORAL DAILY
Qty: 90 TABLET | Refills: 1 | Status: SHIPPED | OUTPATIENT
Start: 2025-07-14

## 2025-07-14 NOTE — TELEPHONE ENCOUNTER
Rx Refill Note  Requested Prescriptions     Pending Prescriptions Disp Refills    lisinopril (PRINIVIL,ZESTRIL) 40 MG tablet [Pharmacy Med Name: LISINOPRIL 40 MG TAB 40 Tablet] 90 tablet 1     Sig: TAKE ONE TABLET BY MOUTH ONCE DAILY FOR 90 DAYS.    metoprolol succinate XL (TOPROL-XL) 50 MG 24 hr tablet [Pharmacy Med Name: METOPROLOL SUCCIN ER 50 TAB 50 Tablet] 90 tablet 1     Sig: TAKE 1 TABLET BY MOUTH DAILY  DAYS.    atorvastatin (LIPITOR) 20 MG tablet [Pharmacy Med Name: ATORVASTATIN CALCIUM 20 MG 20 Tablet] 90 tablet 1     Sig: TAKE 1 TABLET BY MOUTH DAILY FOR 90 DAYS.    amLODIPine (NORVASC) 10 MG tablet [Pharmacy Med Name: AMLODIPINE BESYLATE 10 MG T 10 Tablet] 90 tablet 1     Sig: TAKE ONE TABLET BY MOUTH ONCE DAILY    fenofibrate (TRICOR) 145 MG tablet [Pharmacy Med Name: FENOFIBRATE 145 MG TABLET 145 Tablet] 90 tablet 1     Sig: TAKE 1 TABLET BY MOUTH DAILY.      Last office visit with prescribing clinician: 7/8/2025   Last telemedicine visit with prescribing clinician: Visit date not found   Next office visit with prescribing clinician: 1/8/2026                         Would you like a call back once the refill request has been completed: [] Yes [] No    If the office needs to give you a call back, can they leave a voicemail: [] Yes [] No    Maryellen Black MA  07/14/25, 17:02 EDT

## 2025-07-23 ENCOUNTER — TELEPHONE (OUTPATIENT)
Dept: FAMILY MEDICINE CLINIC | Facility: CLINIC | Age: 85
End: 2025-07-23
Payer: MEDICARE

## 2025-07-23 NOTE — TELEPHONE ENCOUNTER
Caller: [facility]    Relationship: Other    Best call back number: [phone]    Equipment requested: OXYGEN & SUPPLIES       Prescribing Provider: DR. KINSEY     Additional information or concerns: PAPERWORK WAS FAXED IN JUNE, AND REPLY HAS NOT BEEN RECEIVED   FORM NEEDS TO BE SIGNED BY DR. KINSEY FOR PATIENT TO CONTINUE GETTING HIS OXYGEN COVERED THROUGH MEDICARE

## 2025-07-25 NOTE — TELEPHONE ENCOUNTER
Pt notes from last ED visit and office visit with PCP on 3/18/25 printed and placed in dr nieto folder for signature.